# Patient Record
Sex: FEMALE | Race: WHITE | NOT HISPANIC OR LATINO | Employment: UNEMPLOYED | URBAN - METROPOLITAN AREA
[De-identification: names, ages, dates, MRNs, and addresses within clinical notes are randomized per-mention and may not be internally consistent; named-entity substitution may affect disease eponyms.]

---

## 2024-05-07 ENCOUNTER — HOSPITAL ENCOUNTER (EMERGENCY)
Facility: HOSPITAL | Age: 54
Discharge: OTHER INSTITUTION | End: 2024-05-08
Attending: STUDENT IN AN ORGANIZED HEALTH CARE EDUCATION/TRAINING PROGRAM
Payer: COMMERCIAL

## 2024-05-07 DIAGNOSIS — R41.0 DELIRIUM: Primary | ICD-10-CM

## 2024-05-07 LAB
ALBUMIN SERPL-MCNC: 4.5 G/DL (ref 3.5–5)
ALP BLD-CCNC: 132 U/L (ref 35–125)
ALT SERPL-CCNC: 25 U/L (ref 5–40)
ANION GAP SERPL CALC-SCNC: >19 MMOL/L
APAP SERPL-MCNC: 11.2 UG/ML
AST SERPL-CCNC: 32 U/L (ref 5–40)
BASOPHILS # BLD AUTO: 0.02 X10*3/UL (ref 0–0.1)
BASOPHILS NFR BLD AUTO: 0.3 %
BILIRUB SERPL-MCNC: 0.3 MG/DL (ref 0.1–1.2)
BUN SERPL-MCNC: 16 MG/DL (ref 8–25)
CALCIUM SERPL-MCNC: 10.3 MG/DL (ref 8.5–10.4)
CHLORIDE SERPL-SCNC: 97 MMOL/L (ref 97–107)
CO2 SERPL-SCNC: 15 MMOL/L (ref 24–31)
CREAT SERPL-MCNC: 0.7 MG/DL (ref 0.4–1.6)
EGFRCR SERPLBLD CKD-EPI 2021: >90 ML/MIN/1.73M*2
EOSINOPHIL # BLD AUTO: 0 X10*3/UL (ref 0–0.7)
EOSINOPHIL NFR BLD AUTO: 0 %
ERYTHROCYTE [DISTWIDTH] IN BLOOD BY AUTOMATED COUNT: 14 % (ref 11.5–14.5)
ETHANOL SERPL-MCNC: <0.01 G/DL
GLUCOSE SERPL-MCNC: 118 MG/DL (ref 65–99)
HCT VFR BLD AUTO: 41.5 % (ref 36–46)
HGB BLD-MCNC: 14.3 G/DL (ref 12–16)
IMM GRANULOCYTES # BLD AUTO: 0.05 X10*3/UL (ref 0–0.7)
IMM GRANULOCYTES NFR BLD AUTO: 0.6 % (ref 0–0.9)
LIPASE SERPL-CCNC: <16 U/L (ref 16–63)
LYMPHOCYTES # BLD AUTO: 1.04 X10*3/UL (ref 1.2–4.8)
LYMPHOCYTES NFR BLD AUTO: 13.5 %
MCH RBC QN AUTO: 27.6 PG (ref 26–34)
MCHC RBC AUTO-ENTMCNC: 34.5 G/DL (ref 32–36)
MCV RBC AUTO: 80 FL (ref 80–100)
MONOCYTES # BLD AUTO: 0.49 X10*3/UL (ref 0.1–1)
MONOCYTES NFR BLD AUTO: 6.4 %
NEUTROPHILS # BLD AUTO: 6.11 X10*3/UL (ref 1.2–7.7)
NEUTROPHILS NFR BLD AUTO: 79.2 %
NRBC BLD-RTO: 0 /100 WBCS (ref 0–0)
PLATELET # BLD AUTO: 316 X10*3/UL (ref 150–450)
POTASSIUM SERPL-SCNC: 4 MMOL/L (ref 3.4–5.1)
PROT SERPL-MCNC: 8.8 G/DL (ref 5.9–7.9)
RBC # BLD AUTO: 5.19 X10*6/UL (ref 4–5.2)
SALICYLATES SERPL-MCNC: <0 MG/DL
SODIUM SERPL-SCNC: 134 MMOL/L (ref 133–145)
WBC # BLD AUTO: 7.7 X10*3/UL (ref 4.4–11.3)

## 2024-05-07 PROCEDURE — 2500000002 HC RX 250 W HCPCS SELF ADMINISTERED DRUGS (ALT 637 FOR MEDICARE OP, ALT 636 FOR OP/ED)

## 2024-05-07 PROCEDURE — S4991 NICOTINE PATCH NONLEGEND: HCPCS

## 2024-05-07 PROCEDURE — 36415 COLL VENOUS BLD VENIPUNCTURE: CPT

## 2024-05-07 PROCEDURE — 99285 EMERGENCY DEPT VISIT HI MDM: CPT | Mod: 25

## 2024-05-07 PROCEDURE — 83690 ASSAY OF LIPASE: CPT

## 2024-05-07 PROCEDURE — 2500000004 HC RX 250 GENERAL PHARMACY W/ HCPCS (ALT 636 FOR OP/ED): Mod: JZ | Performed by: STUDENT IN AN ORGANIZED HEALTH CARE EDUCATION/TRAINING PROGRAM

## 2024-05-07 PROCEDURE — 2500000004 HC RX 250 GENERAL PHARMACY W/ HCPCS (ALT 636 FOR OP/ED)

## 2024-05-07 PROCEDURE — 80053 COMPREHEN METABOLIC PANEL: CPT

## 2024-05-07 PROCEDURE — 96372 THER/PROPH/DIAG INJ SC/IM: CPT | Performed by: STUDENT IN AN ORGANIZED HEALTH CARE EDUCATION/TRAINING PROGRAM

## 2024-05-07 PROCEDURE — 81003 URINALYSIS AUTO W/O SCOPE: CPT | Mod: 59

## 2024-05-07 PROCEDURE — 80143 DRUG ASSAY ACETAMINOPHEN: CPT

## 2024-05-07 PROCEDURE — 2500000001 HC RX 250 WO HCPCS SELF ADMINISTERED DRUGS (ALT 637 FOR MEDICARE OP): Performed by: EMERGENCY MEDICINE

## 2024-05-07 PROCEDURE — 85025 COMPLETE CBC W/AUTO DIFF WBC: CPT

## 2024-05-07 PROCEDURE — 96372 THER/PROPH/DIAG INJ SC/IM: CPT

## 2024-05-07 PROCEDURE — 80307 DRUG TEST PRSMV CHEM ANLYZR: CPT

## 2024-05-07 RX ORDER — ACETAMINOPHEN 325 MG/1
650 TABLET ORAL EVERY 4 HOURS PRN
Status: DISCONTINUED | OUTPATIENT
Start: 2024-05-07 | End: 2024-05-08 | Stop reason: HOSPADM

## 2024-05-07 RX ORDER — DIPHENHYDRAMINE HYDROCHLORIDE 50 MG/ML
50 INJECTION INTRAMUSCULAR; INTRAVENOUS ONCE
Status: COMPLETED | OUTPATIENT
Start: 2024-05-07 | End: 2024-05-07

## 2024-05-07 RX ORDER — ZIPRASIDONE HYDROCHLORIDE 20 MG/1
20 CAPSULE ORAL
Status: DISCONTINUED | OUTPATIENT
Start: 2024-05-07 | End: 2024-05-08 | Stop reason: HOSPADM

## 2024-05-07 RX ORDER — ZIPRASIDONE MESYLATE 20 MG/ML
20 INJECTION, POWDER, LYOPHILIZED, FOR SOLUTION INTRAMUSCULAR ONCE
Status: COMPLETED | OUTPATIENT
Start: 2024-05-07 | End: 2024-05-07

## 2024-05-07 RX ORDER — IBUPROFEN 200 MG
1 TABLET ORAL DAILY
Status: DISCONTINUED | OUTPATIENT
Start: 2024-05-08 | End: 2024-05-07

## 2024-05-07 RX ORDER — IBUPROFEN 200 MG
1 TABLET ORAL NIGHTLY
Status: DISCONTINUED | OUTPATIENT
Start: 2024-05-07 | End: 2024-05-08 | Stop reason: HOSPADM

## 2024-05-07 RX ORDER — OLANZAPINE 10 MG/2ML
10 INJECTION, POWDER, FOR SOLUTION INTRAMUSCULAR ONCE
Status: COMPLETED | OUTPATIENT
Start: 2024-05-07 | End: 2024-05-07

## 2024-05-07 RX ORDER — LORAZEPAM 1 MG/1
2 TABLET ORAL ONCE
Status: COMPLETED | OUTPATIENT
Start: 2024-05-07 | End: 2024-05-07

## 2024-05-07 RX ADMIN — NICOTINE 1 PATCH: 21 PATCH, EXTENDED RELEASE TRANSDERMAL at 22:57

## 2024-05-07 RX ADMIN — OLANZAPINE 10 MG: 10 INJECTION, POWDER, FOR SOLUTION INTRAMUSCULAR at 17:50

## 2024-05-07 RX ADMIN — ZIPRASIDONE MESYLATE 20 MG: 20 INJECTION, POWDER, LYOPHILIZED, FOR SOLUTION INTRAMUSCULAR at 22:00

## 2024-05-07 RX ADMIN — ACETAMINOPHEN 650 MG: 325 TABLET ORAL at 22:57

## 2024-05-07 RX ADMIN — LORAZEPAM 2 MG: 1 TABLET ORAL at 23:41

## 2024-05-07 RX ADMIN — ZIPRASIDONE HYDROCHLORIDE 20 MG: 20 CAPSULE ORAL at 22:57

## 2024-05-07 RX ADMIN — DIPHENHYDRAMINE HYDROCHLORIDE 50 MG: 50 INJECTION INTRAMUSCULAR; INTRAVENOUS at 22:00

## 2024-05-07 SDOH — HEALTH STABILITY: MENTAL HEALTH: DEPRESSION SYMPTOMS: NO PROBLEMS REPORTED OR OBSERVED.

## 2024-05-07 SDOH — HEALTH STABILITY: MENTAL HEALTH: WISH TO BE DEAD (PAST 1 MONTH): NO

## 2024-05-07 SDOH — HEALTH STABILITY: MENTAL HEALTH: SUICIDAL BEHAVIOR (LIFETIME): NO

## 2024-05-07 SDOH — HEALTH STABILITY: MENTAL HEALTH: NON-SPECIFIC ACTIVE SUICIDAL THOUGHTS (PAST 1 MONTH): NO

## 2024-05-07 SDOH — ECONOMIC STABILITY: HOUSING INSECURITY: FEELS SAFE LIVING IN HOME: YES

## 2024-05-07 SDOH — HEALTH STABILITY: MENTAL HEALTH: IN THE PAST FEW WEEKS, HAVE YOU FELT THAT YOU OR YOUR FAMILY WOULD BE BETTER OFF IF YOU WERE DEAD?: NO

## 2024-05-07 SDOH — HEALTH STABILITY: MENTAL HEALTH: HAVE YOU EVER TRIED TO KILL YOURSELF?: NO

## 2024-05-07 SDOH — HEALTH STABILITY: MENTAL HEALTH: IN THE PAST FEW WEEKS, HAVE YOU WISHED YOU WERE DEAD?: NO

## 2024-05-07 SDOH — HEALTH STABILITY: MENTAL HEALTH: SUICIDAL BEHAVIOR (3 MONTHS): NO

## 2024-05-07 SDOH — HEALTH STABILITY: MENTAL HEALTH: ARE YOU HAVING THOUGHTS OF KILLING YOURSELF RIGHT NOW?: NO

## 2024-05-07 SDOH — HEALTH STABILITY: MENTAL HEALTH: ANXIETY SYMPTOMS: GENERALIZED

## 2024-05-07 SDOH — HEALTH STABILITY: MENTAL HEALTH: IN THE PAST WEEK, HAVE YOU BEEN HAVING THOUGHTS ABOUT KILLING YOURSELF?: NO

## 2024-05-07 ASSESSMENT — PAIN SCALES - GENERAL: PAINLEVEL_OUTOF10: 0 - NO PAIN

## 2024-05-07 ASSESSMENT — COLUMBIA-SUICIDE SEVERITY RATING SCALE - C-SSRS
2. HAVE YOU ACTUALLY HAD ANY THOUGHTS OF KILLING YOURSELF?: NO
6. HAVE YOU EVER DONE ANYTHING, STARTED TO DO ANYTHING, OR PREPARED TO DO ANYTHING TO END YOUR LIFE?: NO
1. SINCE LAST CONTACT, HAVE YOU WISHED YOU WERE DEAD OR WISHED YOU COULD GO TO SLEEP AND NOT WAKE UP?: NO

## 2024-05-07 ASSESSMENT — PAIN - FUNCTIONAL ASSESSMENT: PAIN_FUNCTIONAL_ASSESSMENT: 0-10

## 2024-05-07 ASSESSMENT — LIFESTYLE VARIABLES
SUBSTANCE_ABUSE_PAST_12_MONTHS: YES
PRESCIPTION_ABUSE_PAST_12_MONTHS: NO

## 2024-05-07 NOTE — PROGRESS NOTES
"EPAT - Social Work Psychiatric Assessment    Arrival Details  Mode of Arrival: Ambulance  Admission Source: Home  Admission Type: Involuntary (Pink slip by ED )  EPAT Assessment Start Date: 05/07/24  EPAT Assessment Start Time: 1637  Name of : MARTHA Ponce    History of Present Illness  Admission Reason: Psychiatric Evaluation  HPI: Pt is a 52 y/o F brought in by EMS for altered mental status and possible drug intoxication. Pt's mother called 911 concerned pt was in a manic state due to her presentation. Upon arrival pt appears very paranoid with staff reporting she does not want them in her room because we will \"upload her information into the apps\". Pt reported to staff that she \"did not watch the entire episode and mom got mad, and this is why she called 911\". Pt admits to taking 3 marijuana tablets because she was \"freaking out\" at her mom for trying to upload her information. Social work consulted due to pt presenting very disorganized and paranoid with possible underlining mental health concerns. Social work reviewed Pts chart, medical record, and provider notes which indicate history of Schizoaffective DO, MDD, CANDY, PTSD, and substance use. Records indicate outpatient providers and medication currently. Pt denies SI/HI/AVH but continues to appear very paranoid and distrusting of staff. The triage risk assessment was reviewed and Pt was inidcated to be low risk during triage assessment.    SW Readmission Information   Readmission within 30 Days: No    Psychiatric Symptoms  Anxiety Symptoms: Generalized  Depression Symptoms: No problems reported or observed.  Grisel Symptoms: Labile, Poor judgment, Pressured speech    Psychosis Symptoms  Hallucination Type: No problems reported or observed.  Delusion Type: Paranoid    Additional Symptoms - Adult  Generalized Anxiety Disorder: No problems reported or observed.  Obsessive Compulsive Disorder: No problems reported or observed.  Panic " "Attack: No problems reported or observed.  Post Traumatic Stress Disorder: No problems reported or observed.  Delirium: No problems reported or observed.    Past Psychiatric History/Meds/Treatments  Past Psychiatric History: History of schizoaffective, depression, anxiety and PTSD. Pt has extensive history of THC reporting she has been using since she was a teenager. Pt also has history of alcohol abuse but nothing documented since 2015. Pt admits she is on \"loads of medications\" but cannot say what they are.  Past Psychiatric Meds/Treatments: Pt admits to being hospitalized when she was a teenager for mental health. She has outpatient providers through [facility]  Past Violence/Victimization History: None reported    Current Mental Health Contacts  Provider Name/Phone Number: [facility], pt could not verify name of specific provider. Per records pt met with Adia Barron for last medication management appointment.  Provider Last Appointment Date: 2/5/2024    Support System: Immediate family    Living Arrangement: Lives with someone (mother)    Home Safety  Feels Safe Living in Home: Yes    Income Information  Employment Status for: Patient  Employment Status: Unemployed    [place] Service/Education History  Current or Previous  Service: None    Social/Cultural History  Cultural Requests During Hospitalization: None  Spiritual Requests During Hospitalization: None    Legal  Legal Considerations: Patient/ Family Ability to Make Healthcare Decisions  Assistance with Managing/Advocating Healthcare Needs: Other (Comment)  Criminal Activity/ Legal Involvement Pertinent to Current Situation/ Hospitalization: None  Legal Concerns: None    Drug Screening  Have you used any substances (canabis, cocaine, heroin, hallucinogens, inhalants, etc.) in the past 12 months?: Yes  Have you used any prescription drugs other than prescribed in the past 12 months?: No  Is a toxicology screen needed?: Yes    Stage of " Change  Stage of Change: Precontemplation  Type of Treatment Offered: Other (Comment) (unknown, pt poor historian and will not disclose information due to suspicion of staff members.)  Frequency of Substance Use: Daily or almost daily for marijuana use.    Psychosocial  Psychosocial (WDL): Within Defined Limits    Orientation  Orientation Level: Oriented X4    General Appearance  Motor Activity: Restlessness  Speech Pattern: Pressured  General Attitude: Cooperative, Guarded, Suspicious  Appearance/Hygiene: Disheveled    Thought Process  Coherency: Blocking, Disorganized  Content: Blaming others  Delusions: Paranoid  Perception: Not altered  Hallucination: None  Judgment/Insight: Impaired  Confusion: None  Cognition: Poor judgement    Sleep Pattern  Sleep Pattern: Unable to assess    Risk Factors  Self Harm/Suicidal Ideation Plan: Pt denies  Previous Self Harm/Suicidal Plans: Pt denies any history of SI or attempts.  Risk Factors: Major mental illness, Lower socioeconomic status, Substance abuse, Unemployment    Violence Risk Assessment  Assessment of Violence: None noted  Thoughts of Harm to Others: No    Ability to Assess Risk Screen  Risk Screen - Ability to Assess: Able to be screened  Ask Suicide-Screening Questions  1. In the past few weeks, have you wished you were dead?: No  2. In the past few weeks, have you felt that you or your family would be better off if you were dead?: No  3. In the past week, have you been having thoughts about killing yourself?: No  4. Have you ever tried to kill yourself?: No  5. Are you having thoughts of killing yourself right now?: No  Calculated Risk Score: No intervention is necessary  [place] Suicide Severity Rating Scale (Screener/Recent Self-Report)  1. Wish to be Dead (Past 1 Month): No  2. Non-Specific Active Suicidal Thoughts (Past 1 Month): No  6. Suicidal Behavior (Lifetime): No  6. Suicidal Behavior (3 Months): No  Calculated C-SSRS Risk Score (Lifetime/Recent): No  "Risk Indicated  Step 1: Risk Factors  Current & Past Psychiatric Dx: Mood disorder, Psychotic disorder, Alcohol/substance abuse disorders, PTSD  Presenting Symptoms: Impulsivity, Anxiety and/or panic  Precipitants/Stressors: Triggering events leading to humiliation, shame, and/or despair (e.g. loss of relationship, financial or health status) (real or anticipated), Substance intoxication or withdrawal  Change in Treatment: Other (Comment)  Access to Lethal Methods : No  Step 2: Protective Factors   Protective Factors Internal: Identifies reasons for living  Protective Factors External: Supportive social network or family or friends, Positive therapeutic relationships  Step 3: Suicidal Ideation Intensity  Are There Things - Anyone or Anything - That Stopped You From Wanting to Die or Acting on: Does not apply  What Sort of Reasons Did You Have For Thinking About Wanting to Die or Killing Yourself: Does not apply  Step 5: Documentation  Risk Level: Low suicide risk    Psychiatric Impression and Plan of Care  Assessment and Plan: Upon assessment Pt continues to present with extreme paranoia and would not provide information or history believing social work is uploading her information to \"apps\" on the TV in the room. Social work explained TV is off and unplugged and pt calmed down for a period of time and began answering some questions about how her mother \"called Temu\" because pt \"uploaded too many photo's on her phone\". Pt states \"I was freaking out\" believing that all her conversations and photos on the phone were being accessed by mom and so she took the marijuana to calm down. Pt states this is why she is here. Then pt began looking at the TV and out the door at staff in the ED stating she did not believe social work was telling her the truth \"about the apps\" and she did not want to provider her mental health history because she states \"your minion friend out there will upload it for you\" pointing outside the room " "to noone. Pt was referring to another pt that had walked to the bathroom reporting \"your friend is staring at me\" and \"I know she is listening to my information\". Pt admits she is on \"loads of medication\" but could not say what she is currently prescribed. Pt continues to deny SI/HI/AVH but does not appear to have any insight into situation. Pt presents with low risk at time of social work assessment however still presents with extreme paranoia and thought blocking.  Specific Resources Provided to Patient: Peer support not needed at this time.  CM Notified: Yes  Plan Comments: Diagnostic Impression: Schizoaffective DO    Outcome/Disposition  Assessment, Recommendations and Risk Level Reviewed with: Reviewed case with Dr. Ortega who is in agreement pt would benefit inpatient placement for safety and stabilization.  EPAT Assessment Completed Date: 05/07/24  EPAT Assessment Completed Time: 1703  Patient Disposition: Out of network facility (Specify)  Out of Network Reason: Other (Comment)      "

## 2024-05-07 NOTE — ED PROVIDER NOTES
HPI   Chief Complaint   Patient presents with    Altered Mental Status       HPI  Is a 53-year-old female with history of schizoaffective disorder, polysubstance abuse who presents to ED for altered mental status.  EMS was called by patient's mother, patient lives with mother in apartment.  I contacted the mother of the patient and spoke at length with her about her daughter.  Per the mother her daughter has a history of heavy alcohol abuse, has recently been taking a lot of unknown pills, smokes cannabis daily.  Mother is an elderly 81-year-old woman and feels she cannot take care of her daughter so she called EMS.  Patient is not answering questions appropriately which limits history.                  Erica Coma Scale Score: 14                     Patient History   No past medical history on file.  No past surgical history on file.  No family history on file.  Social History     Tobacco Use    Smoking status: Not on file    Smokeless tobacco: Not on file   Substance Use Topics    Alcohol use: Not on file    Drug use: Not on file       Physical Exam   ED Triage Vitals [05/07/24 1512]   Temp Heart Rate Respirations BP   -- 83 17 (!) 189/120      Pulse Ox Temp src Heart Rate Source Patient Position   100 % -- -- --      BP Location FiO2 (%)     -- --       Physical Exam  Vitals reviewed.   Constitutional:       Appearance: She is well-developed.   HENT:      Head: Atraumatic.   Eyes:      Pupils: Pupils are equal, round, and reactive to light.   Cardiovascular:      Rate and Rhythm: Normal rate.   Pulmonary:      Effort: Pulmonary effort is normal.   Abdominal:      General: Abdomen is flat.   Musculoskeletal:         General: Normal range of motion.      Cervical back: Neck supple.   Skin:     General: Skin is dry.   Neurological:      General: No focal deficit present.   Psychiatric:         Mood and Affect: Mood normal.         Behavior: Behavior normal.         ED Course & MDM   Diagnoses as of 05/08/24 0103    Delirium       Medical Decision Making  Parts of this chart have been completed using voice recognition software. Please excuse any errors of transcription.  My thought process and reason for plan has been formulated from the time that I saw the patient until the time of disposition and is not specific to one specific moment during their visit and furthermore my MDM encompasses this entire chart and not only this text box.    HPI:   Detailed above.    Exam:   A medically appropriate exam performed, outlined above, given the known history and presentation.    History obtained from:   Patient's mother, EMR    EKG/Cardiac monitor:       Social Determinants of Health considered during this visit:   Housing, Family or social support    Medications given during visit:  Medications   ziprasidone (Geodon) capsule 20 mg (20 mg oral Given 5/7/24 2257)   acetaminophen (Tylenol) tablet 650 mg (650 mg oral Given 5/7/24 2257)   nicotine (Nicoderm CQ) 21 mg/24 hr patch 1 patch (1 patch transdermal Medication Applied 5/7/24 2257)   OLANZapine (ZyPREXA) injection 10 mg (10 mg intramuscular Given 5/7/24 1750)   ziprasidone (Geodon) injection 20 mg (20 mg intramuscular Given 5/7/24 2200)   diphenhydrAMINE (BENADryl) injection 50 mg (50 mg intramuscular Given 5/7/24 2200)   LORazepam (Ativan) tablet 2 mg (2 mg oral Given 5/7/24 2341)        Diagnostic/tests:  Labs Reviewed   CBC WITH AUTO DIFFERENTIAL - Abnormal       Result Value    WBC 7.7      nRBC 0.0      RBC 5.19      Hemoglobin 14.3      Hematocrit 41.5      MCV 80      MCH 27.6      MCHC 34.5      RDW 14.0      Platelets 316      Neutrophils % 79.2      Immature Granulocytes %, Automated 0.6      Lymphocytes % 13.5      Monocytes % 6.4      Eosinophils % 0.0      Basophils % 0.3      Neutrophils Absolute 6.11      Immature Granulocytes Absolute, Automated 0.05      Lymphocytes Absolute 1.04 (*)     Monocytes Absolute 0.49      Eosinophils Absolute 0.00      Basophils Absolute  0.02     COMPREHENSIVE METABOLIC PANEL - Abnormal    Glucose 118 (*)     Sodium 134      Potassium 4.0      Chloride 97      Bicarbonate 15 (*)     Urea Nitrogen 16      Creatinine 0.70      eGFR >90      Calcium 10.3      Albumin 4.5      Alkaline Phosphatase 132 (*)     Total Protein 8.8 (*)     AST 32      Bilirubin, Total 0.3      ALT 25      Anion Gap >19 (*)    LIPASE - Abnormal    Lipase <16 (*)    ACUTE TOXICOLOGY PANEL, BLOOD - Normal    Acetaminophen 11.2      Salicylate  <0      Alcohol <0.010     URINALYSIS WITH REFLEX CULTURE AND MICROSCOPIC    Narrative:     The following orders were created for panel order Urinalysis with Reflex Culture and Microscopic.  Procedure                               Abnormality         Status                     ---------                               -----------         ------                     Urinalysis with Reflex C...[[id number]]                                                 Extra Urine Gray Tube[[id number]]                                                         Please view results for these tests on the individual orders.   DRUG SCREEN,URINE   URINALYSIS WITH REFLEX CULTURE AND MICROSCOPIC   EXTRA URINE GRAY TUBE      No orders to display        Differential Diagnosis:   As I have deemed necessary from their history, physical and studies, I have considered the following diagnoses:     SUICIDAL IDEATION  HOMICIDAL IDEATION  SCHIZOPHRENIA  BIPOLAR DISORDER  MAJOR DEPRESSIVE DISORDER  GENERALIZED ANXIETY DISORDER  SUBSTANCE ABUSE  ALCOHOL ABUSE  ACUTE STRESS REACTION      Consultations:      Procedures:      Critical Care:  Upon my evaluation, this patient had a high probability of imminent or life-threatening deterioration due to altered mental status/acute psychosis, which required my direct attention, intervention, and personal management.    I have personally provided 30 minutes of non-concurrent critical care time exclusive of time spent on  separately billable procedures. Time includes review of laboratory data, radiology results, discussion with consultants, and monitoring for potential decompensation. Interventions were performed as documented above.    Interventions include: Prevention of self-harm    Referrals:      Discharge Prescriptions:      MDM Summary:  Patient is unable to make decisions for herself at this time, she appears acutely psychotic.  Lab workup is unremarkable.  Urinalysis and urine drug screen are still pending.  I have signed out the patient´s emergency department care to the attending physician Dr. Seth. We discussed the pertinent history, physical exam, completed/pending test results (if applicable) and current treatment plan. Please refer to his/her chart for the patients remaining Emergency Department course and final disposition.      Procedure  Procedures     Ralph Wayne PA-C  05/08/24 0104

## 2024-05-08 VITALS
DIASTOLIC BLOOD PRESSURE: 88 MMHG | HEIGHT: 64 IN | TEMPERATURE: 98.2 F | OXYGEN SATURATION: 100 % | WEIGHT: 138.23 LBS | BODY MASS INDEX: 23.6 KG/M2 | RESPIRATION RATE: 16 BRPM | HEART RATE: 88 BPM | SYSTOLIC BLOOD PRESSURE: 138 MMHG

## 2024-05-08 LAB
AMPHETAMINES UR QL SCN>1000 NG/ML: NEGATIVE
APPEARANCE UR: CLEAR
BARBITURATES UR QL SCN>300 NG/ML: NEGATIVE
BENZODIAZ UR QL SCN>300 NG/ML: NEGATIVE
BILIRUB UR STRIP.AUTO-MCNC: NEGATIVE MG/DL
BZE UR QL SCN>300 NG/ML: NEGATIVE
CANNABINOIDS UR QL SCN>50 NG/ML: POSITIVE
COLOR UR: ABNORMAL
FENTANYL+NORFENTANYL UR QL SCN: NEGATIVE
GLUCOSE UR STRIP.AUTO-MCNC: NORMAL MG/DL
HOLD SPECIMEN: NORMAL
KETONES UR STRIP.AUTO-MCNC: ABNORMAL MG/DL
LEUKOCYTE ESTERASE UR QL STRIP.AUTO: NEGATIVE
METHADONE UR QL SCN>300 NG/ML: NEGATIVE
NITRITE UR QL STRIP.AUTO: NEGATIVE
OPIATES UR QL SCN>300 NG/ML: NEGATIVE
OXYCODONE UR QL: NEGATIVE
PCP UR QL SCN>25 NG/ML: NEGATIVE
PH UR STRIP.AUTO: 5.5 [PH]
PROT UR STRIP.AUTO-MCNC: NEGATIVE MG/DL
RBC # UR STRIP.AUTO: NEGATIVE /UL
SP GR UR STRIP.AUTO: 1.01
UROBILINOGEN UR STRIP.AUTO-MCNC: NORMAL MG/DL

## 2024-05-08 NOTE — ED NOTES
Patient accepted at [facility] , report given to nurse Kruse. Security and PCA to transport     Hinafatmata GARCIA Guy, CLAIRE  05/08/24 0235

## 2024-05-08 NOTE — ED NOTES
Patient ambulated to restroom and provided urine sample which was sent to lab, social work brian Tovar, CLAIRE  05/08/24 0146

## 2024-05-22 ENCOUNTER — DOCUMENTATION (OUTPATIENT)
Dept: SOCIAL WORK | Age: 54
End: 2024-05-22
Payer: COMMERCIAL

## 2024-05-22 ENCOUNTER — HOSPITAL ENCOUNTER (EMERGENCY)
Facility: HOSPITAL | Age: 54
Discharge: OTHER INSTITUTION | End: 2024-05-23
Attending: EMERGENCY MEDICINE
Payer: COMMERCIAL

## 2024-05-22 ENCOUNTER — APPOINTMENT (OUTPATIENT)
Dept: CARDIOLOGY | Facility: HOSPITAL | Age: 54
End: 2024-05-22
Payer: COMMERCIAL

## 2024-05-22 DIAGNOSIS — F23 ACUTE PSYCHOSIS (MULTI): Primary | ICD-10-CM

## 2024-05-22 DIAGNOSIS — Z86.59 HISTORY OF SCHIZOAFFECTIVE DISORDER: ICD-10-CM

## 2024-05-22 LAB
ALBUMIN SERPL-MCNC: 4.4 G/DL (ref 3.5–5)
ALP BLD-CCNC: 100 U/L (ref 35–125)
ALT SERPL-CCNC: 15 U/L (ref 5–40)
ANION GAP SERPL CALC-SCNC: 18 MMOL/L
APAP SERPL-MCNC: <5 UG/ML
AST SERPL-CCNC: 26 U/L (ref 5–40)
BASOPHILS # BLD AUTO: 0.04 X10*3/UL (ref 0–0.1)
BASOPHILS NFR BLD AUTO: 0.3 %
BILIRUB SERPL-MCNC: 0.3 MG/DL (ref 0.1–1.2)
BUN SERPL-MCNC: 14 MG/DL (ref 8–25)
CALCIUM SERPL-MCNC: 9.6 MG/DL (ref 8.5–10.4)
CHLORIDE SERPL-SCNC: 92 MMOL/L (ref 97–107)
CO2 SERPL-SCNC: 18 MMOL/L (ref 24–31)
CREAT SERPL-MCNC: 0.9 MG/DL (ref 0.4–1.6)
EGFRCR SERPLBLD CKD-EPI 2021: 77 ML/MIN/1.73M*2
EOSINOPHIL # BLD AUTO: 0.02 X10*3/UL (ref 0–0.7)
EOSINOPHIL NFR BLD AUTO: 0.1 %
ERYTHROCYTE [DISTWIDTH] IN BLOOD BY AUTOMATED COUNT: 15 % (ref 11.5–14.5)
ETHANOL SERPL-MCNC: <0.01 G/DL
GLUCOSE SERPL-MCNC: 192 MG/DL (ref 65–99)
HCT VFR BLD AUTO: 33.7 % (ref 36–46)
HGB BLD-MCNC: 11.3 G/DL (ref 12–16)
IMM GRANULOCYTES # BLD AUTO: 0.07 X10*3/UL (ref 0–0.7)
IMM GRANULOCYTES NFR BLD AUTO: 0.5 % (ref 0–0.9)
LYMPHOCYTES # BLD AUTO: 1.39 X10*3/UL (ref 1.2–4.8)
LYMPHOCYTES NFR BLD AUTO: 9.1 %
MCH RBC QN AUTO: 28 PG (ref 26–34)
MCHC RBC AUTO-ENTMCNC: 33.5 G/DL (ref 32–36)
MCV RBC AUTO: 83 FL (ref 80–100)
MONOCYTES # BLD AUTO: 1.53 X10*3/UL (ref 0.1–1)
MONOCYTES NFR BLD AUTO: 10 %
NEUTROPHILS # BLD AUTO: 12.21 X10*3/UL (ref 1.2–7.7)
NEUTROPHILS NFR BLD AUTO: 80 %
NRBC BLD-RTO: 0 /100 WBCS (ref 0–0)
PLATELET # BLD AUTO: 257 X10*3/UL (ref 150–450)
POTASSIUM SERPL-SCNC: 4.2 MMOL/L (ref 3.4–5.1)
PROT SERPL-MCNC: 7.7 G/DL (ref 5.9–7.9)
RBC # BLD AUTO: 4.04 X10*6/UL (ref 4–5.2)
SALICYLATES SERPL-MCNC: <0 MG/DL
SARS-COV-2 RNA RESP QL NAA+PROBE: NOT DETECTED
SODIUM SERPL-SCNC: 128 MMOL/L (ref 133–145)
WBC # BLD AUTO: 15.3 X10*3/UL (ref 4.4–11.3)

## 2024-05-22 PROCEDURE — 80320 DRUG SCREEN QUANTALCOHOLS: CPT | Performed by: EMERGENCY MEDICINE

## 2024-05-22 PROCEDURE — 80053 COMPREHEN METABOLIC PANEL: CPT | Performed by: EMERGENCY MEDICINE

## 2024-05-22 PROCEDURE — 2500000004 HC RX 250 GENERAL PHARMACY W/ HCPCS (ALT 636 FOR OP/ED): Performed by: EMERGENCY MEDICINE

## 2024-05-22 PROCEDURE — 90839 PSYTX CRISIS INITIAL 60 MIN: CPT

## 2024-05-22 PROCEDURE — 99285 EMERGENCY DEPT VISIT HI MDM: CPT | Mod: 25,CS

## 2024-05-22 PROCEDURE — 93005 ELECTROCARDIOGRAM TRACING: CPT

## 2024-05-22 PROCEDURE — 85025 COMPLETE CBC W/AUTO DIFF WBC: CPT | Performed by: EMERGENCY MEDICINE

## 2024-05-22 PROCEDURE — 2500000006 HC RX 250 W HCPCS SELF ADMINISTERED DRUGS (ALT 637 FOR ALL PAYERS): Performed by: EMERGENCY MEDICINE

## 2024-05-22 PROCEDURE — 87635 SARS-COV-2 COVID-19 AMP PRB: CPT | Performed by: EMERGENCY MEDICINE

## 2024-05-22 PROCEDURE — 96372 THER/PROPH/DIAG INJ SC/IM: CPT | Performed by: EMERGENCY MEDICINE

## 2024-05-22 PROCEDURE — 36415 COLL VENOUS BLD VENIPUNCTURE: CPT | Performed by: EMERGENCY MEDICINE

## 2024-05-22 RX ORDER — DROPERIDOL 2.5 MG/ML
5 INJECTION, SOLUTION INTRAMUSCULAR; INTRAVENOUS ONCE
Status: COMPLETED | OUTPATIENT
Start: 2024-05-22 | End: 2024-05-22

## 2024-05-22 RX ORDER — DIAZEPAM 5 MG/1
10 TABLET ORAL ONCE
Status: COMPLETED | OUTPATIENT
Start: 2024-05-22 | End: 2024-05-22

## 2024-05-22 RX ORDER — DIPHENHYDRAMINE HYDROCHLORIDE 50 MG/ML
50 INJECTION INTRAMUSCULAR; INTRAVENOUS ONCE
Status: COMPLETED | OUTPATIENT
Start: 2024-05-22 | End: 2024-05-22

## 2024-05-22 RX ORDER — LORAZEPAM 2 MG/ML
1 INJECTION INTRAMUSCULAR ONCE
Status: COMPLETED | OUTPATIENT
Start: 2024-05-22 | End: 2024-05-22

## 2024-05-22 RX ADMIN — DROPERIDOL 5 MG: 2.5 INJECTION, SOLUTION INTRAMUSCULAR; INTRAVENOUS at 19:22

## 2024-05-22 RX ADMIN — DIAZEPAM 10 MG: 5 TABLET ORAL at 23:05

## 2024-05-22 RX ADMIN — DIPHENHYDRAMINE HYDROCHLORIDE 50 MG: 50 INJECTION, SOLUTION INTRAMUSCULAR; INTRAVENOUS at 19:22

## 2024-05-22 RX ADMIN — LORAZEPAM 1 MG: 2 INJECTION INTRAMUSCULAR; INTRAVENOUS at 19:22

## 2024-05-22 SDOH — HEALTH STABILITY: MENTAL HEALTH: BEHAVIORS/MOOD: AGITATED;ANXIOUS;COMBATIVE;DELUSIONS;IMPULSIVE;HYPER-VERBAL;PARANOID

## 2024-05-22 SDOH — HEALTH STABILITY: MENTAL HEALTH: SUICIDAL BEHAVIOR (LIFETIME): NO

## 2024-05-22 SDOH — HEALTH STABILITY: MENTAL HEALTH: HAVE YOU WISHED YOU WERE DEAD OR WISHED YOU COULD GO TO SLEEP AND NOT WAKE UP?: NO

## 2024-05-22 SDOH — HEALTH STABILITY: MENTAL HEALTH: IN THE PAST WEEK, HAVE YOU BEEN HAVING THOUGHTS ABOUT KILLING YOURSELF?: NO

## 2024-05-22 SDOH — HEALTH STABILITY: MENTAL HEALTH: SLEEP PATTERN: RESTLESSNESS;INSOMNIA

## 2024-05-22 SDOH — HEALTH STABILITY: MENTAL HEALTH: HAVE YOU ACTUALLY HAD ANY THOUGHTS OF KILLING YOURSELF?: NO

## 2024-05-22 SDOH — HEALTH STABILITY: MENTAL HEALTH: SUICIDE ASSESSMENT: ADULT (C-SSRS)

## 2024-05-22 SDOH — HEALTH STABILITY: MENTAL HEALTH: ARE YOU HAVING THOUGHTS OF KILLING YOURSELF RIGHT NOW?: NO

## 2024-05-22 SDOH — HEALTH STABILITY: MENTAL HEALTH: IN THE PAST FEW WEEKS, HAVE YOU FELT THAT YOU OR YOUR FAMILY WOULD BE BETTER OFF IF YOU WERE DEAD?: NO

## 2024-05-22 SDOH — HEALTH STABILITY: MENTAL HEALTH: ANXIETY SYMPTOMS: PANIC ATTACK;GENERALIZED;OBSESSIONS

## 2024-05-22 SDOH — HEALTH STABILITY: MENTAL HEALTH: WISH TO BE DEAD (PAST 1 MONTH): NO

## 2024-05-22 SDOH — HEALTH STABILITY: MENTAL HEALTH: IN THE PAST FEW WEEKS, HAVE YOU WISHED YOU WERE DEAD?: NO

## 2024-05-22 SDOH — HEALTH STABILITY: MENTAL HEALTH: HAVE YOU EVER TRIED TO KILL YOURSELF?: NO

## 2024-05-22 SDOH — HEALTH STABILITY: MENTAL HEALTH: DELUSIONS: PARANOID

## 2024-05-22 SDOH — HEALTH STABILITY: MENTAL HEALTH: NON-SPECIFIC ACTIVE SUICIDAL THOUGHTS (PAST 1 MONTH): NO

## 2024-05-22 SDOH — HEALTH STABILITY: MENTAL HEALTH: DEPRESSION SYMPTOMS: NO PROBLEMS REPORTED OR OBSERVED.

## 2024-05-22 SDOH — ECONOMIC STABILITY: HOUSING INSECURITY: FEELS SAFE LIVING IN HOME: NO

## 2024-05-22 SDOH — HEALTH STABILITY: MENTAL HEALTH: HAVE YOU EVER DONE ANYTHING, STARTED TO DO ANYTHING, OR PREPARED TO DO ANYTHING TO END YOUR LIFE?: NO

## 2024-05-22 ASSESSMENT — LIFESTYLE VARIABLES
PRESCIPTION_ABUSE_PAST_12_MONTHS: NO
SUBSTANCE_ABUSE_PAST_12_MONTHS: NO

## 2024-05-22 NOTE — ED NOTES
Blood obtained and sent to the lab. Patient is yelling out in the room.      Lorenza Allen RN  05/22/24 1937

## 2024-05-22 NOTE — ED NOTES
Patient belongings labeled and placed in locker 7      1 shirt  1 pair of pants  1 pair of shoes  No phone      Ariella Roa, EMT  05/22/24 7267

## 2024-05-22 NOTE — ED PROVIDER NOTES
"HPI   Chief Complaint   Patient presents with    Psychiatric Evaluation     Pt arrives via [employer]. Pt states that [facility] is trying to take her. Pt keeps repeating the same things over and over again         History provided by:  Patient  History limited by:  Psychiatric disorder   used: No      53-year-old female brought to the emergency department by the police    She says that her mother has been poisoning her with Seroquel and Protonix.    She denies any headache, chest pain, abdominal pain    She is otherwise not able to give much history as she keeps perseverating     From phone call earlier today with mental health care worker…  5/22/2024 3:00 PM   Reason For Visit Assessment following emergency department or hospital discharge.   Interval History and Patient Concerns Pt's mother Guera called w concerns over Prabha. She reports that pt is acting : irrational, rambling, and police were clld to her apt to assess pt\" \" The PD recommended, [facility] was called for, but they never came to see pt\" Spoke w pt, and she gave me verbal permission to s/w mom Guera. JESS pt, she appears irrational, not answering my quest's appropriately. Pt admits to smoking THC. Unsure is she is taking her meds- seroquel 800 mgs HS, prescribed upon dc at [facility] 5/20/24 for juan j. Pt last saw Adia Barron, 10/23. Made an appt for pt to see Adia tomorrow. Advised, mother to call 911 or take pt back to ER. Mom is declining to do so. Advised, I will send message to provider Adia, to see her recommendations . Advised, mom to f.up w [facility]. Pt did deny any SI, or HI. She denies using any other sub's other than THC Pt appears disorganized, her insight and judegement apear poor, she is unable to tell me about her hospitalization recently for juan j or med hx   Medication Adherence Poor                No data recorded                   Patient History   No past medical history on file.  No past surgical " history on file.  No family history on file.  Social History     Tobacco Use    Smoking status: Not on file    Smokeless tobacco: Not on file   Substance Use Topics    Alcohol use: Not on file    Drug use: Not on file       Physical Exam   ED Triage Vitals [05/22/24 1855]   Temperature Heart Rate Respirations BP   36.8 °C (98.2 °F) (!) 104 18 (!) 187/130      Pulse Ox Temp Source Heart Rate Source Patient Position   99 % Oral -- --      BP Location FiO2 (%)     -- --       Physical Exam  Vitals and nursing note reviewed.   Constitutional:       General: She is not in acute distress.     Appearance: She is well-developed.      Comments: 53-year-old white female who is screaming at staff and hysterical.  Her speech is clear but delusional.  Perseverating that her mother is poisoning her and that [facility] are after her  Moving all 4 extremities   HENT:      Head: Normocephalic and atraumatic.   Eyes:      Conjunctiva/sclera: Conjunctivae normal.   Cardiovascular:      Rate and Rhythm: Normal rate and regular rhythm.      Heart sounds: No murmur heard.  Pulmonary:      Effort: Pulmonary effort is normal. No respiratory distress.      Breath sounds: Normal breath sounds.   Abdominal:      Palpations: Abdomen is soft.      Tenderness: There is no abdominal tenderness.   Musculoskeletal:         General: No swelling.      Cervical back: Neck supple.   Skin:     General: Skin is warm and dry.      Capillary Refill: Capillary refill takes less than 2 seconds.   Neurological:      Mental Status: She is alert.   Psychiatric:         Attention and Perception: She perceives auditory hallucinations.         Mood and Affect: Mood normal.         Behavior: Behavior is agitated and aggressive.         Thought Content: Thought content is paranoid.         Judgment: Judgment is impulsive.         ED Course & MDM   ED Course as of 05/22/24 2248   Wed May 22, 2024   2220 Normal sinus rhythm 96.  Normal EKG   no ischemia.   Similar to  11/6/2015 [JN]      ED Course User Index  [JN] Dio Sanchez MD         Diagnoses as of 05/22/24 2248   Acute psychosis (Multi)   History of schizoaffective disorder       Medical Decision Making      53-year-old female with a history of schizoaffective disorder medical noncompliance brought to the emergency department by the police  She says that her mother has been poisoning her with Seroquel and Protonix.  She denies any headache, chest pain, abdominal pain  She is otherwise not able to give much history as she keeps perseverating  Review of records demonstrates that this is typical of her acute psychosis presentations      Physical Exam  Vitals and nursing note reviewed.   Constitutional:       General: She is not in acute distress.     Appearance: She is well-developed.      Comments: 53-year-old white female who is screaming at staff and hysterical.  Her speech is clear but delusional.  Perseverating that her mother is poisoning her and that [facility] are after her  Moving all 4 extremities   Neurological:      Mental Status: She is alert.   Psychiatric:         Attention and Perception: She perceives auditory hallucinations.         Mood and Affect: Mood normal.         Behavior: Behavior is agitated and aggressive.         Thought Content: Thought content is paranoid.         Judgment: Judgment is impulsive.       Update 10:46 PM  Patient has improved significantly with Inapsine, Benadryl and Ativan.  She is still somewhat hypertensive  But does not have any evidence of an encephalopathy  CMP demonstrates hyponatremia which is chronic acetaminophen, salicylate and alcohol levels are undetectable  Leukocytosis of 15.3 but that is felt to be secondary to stress.  No evidence of a infectious etiology.  She has no temperature EKG is unremarkable she will be getting 10 mg of Valium and we will carefully monitor her blood pressure    She is signed out to the night shift team whose responsibilities  include  Follow-up blood pressure readings  Follow-up with psychiatric evaluation  Disposition    No orders to display       Labs Reviewed   CBC WITH AUTO DIFFERENTIAL - Abnormal       Result Value    WBC 15.3 (*)     nRBC 0.0      RBC 4.04      Hemoglobin 11.3 (*)     Hematocrit 33.7 (*)     MCV 83      MCH 28.0      MCHC 33.5      RDW 15.0 (*)     Platelets 257      Neutrophils % 80.0      Immature Granulocytes %, Automated 0.5      Lymphocytes % 9.1      Monocytes % 10.0      Eosinophils % 0.1      Basophils % 0.3      Neutrophils Absolute 12.21 (*)     Immature Granulocytes Absolute, Automated 0.07      Lymphocytes Absolute 1.39      Monocytes Absolute 1.53 (*)     Eosinophils Absolute 0.02      Basophils Absolute 0.04     COMPREHENSIVE METABOLIC PANEL - Abnormal    Glucose 192 (*)     Sodium 128 (*)     Potassium 4.2      Chloride 92 (*)     Bicarbonate 18 (*)     Urea Nitrogen 14      Creatinine 0.90      eGFR 77      Calcium 9.6      Albumin 4.4      Alkaline Phosphatase 100      Total Protein 7.7      AST 26      Bilirubin, Total 0.3      ALT 15      Anion Gap 18     ACUTE TOXICOLOGY PANEL, BLOOD - Normal    Acetaminophen <5.0      Salicylate  <0      Alcohol <0.010     SARS-COV-2 PCR - Normal    Coronavirus 2019, PCR Not Detected      Narrative:     This assay has received FDA Emergency Use Authorization (EUA) and is only authorized for the duration of time that circumstances exist to justify the authorization of the emergency use of in vitro diagnostic tests for the detection of SARS-CoV-2 virus and/or diagnosis of COVID-19 infection under section 564(b)(1) of the Act, 21 U.S.C. 360bbb-3(b)(1). This assay is an in vitro diagnostic nucleic acid amplification test for the qualitative detection of SARS-CoV-2 from nasopharyngeal specimens and has been validated for use at [facility]. Negative results do not preclude COVID-19 infections and should not be used as the sole basis for  diagnosis, treatment, or other management decisions.     DRUG SCREEN,URINE             Procedure  Procedures     Dio Sanchez MD  05/22/24 3300       Dio Sanchez MD  05/22/24 0012

## 2024-05-23 VITALS
WEIGHT: 136.69 LBS | RESPIRATION RATE: 20 BRPM | OXYGEN SATURATION: 98 % | SYSTOLIC BLOOD PRESSURE: 128 MMHG | HEIGHT: 64 IN | DIASTOLIC BLOOD PRESSURE: 98 MMHG | HEART RATE: 95 BPM | BODY MASS INDEX: 23.34 KG/M2 | TEMPERATURE: 98.2 F

## 2024-05-23 LAB
AMPHETAMINES UR QL SCN>1000 NG/ML: NEGATIVE
APPEARANCE UR: CLEAR
BARBITURATES UR QL SCN>300 NG/ML: NEGATIVE
BENZODIAZ UR QL SCN>300 NG/ML: NEGATIVE
BILIRUB UR STRIP.AUTO-MCNC: NEGATIVE MG/DL
BZE UR QL SCN>300 NG/ML: NEGATIVE
CANNABINOIDS UR QL SCN>50 NG/ML: POSITIVE
COLOR UR: ABNORMAL
FENTANYL+NORFENTANYL UR QL SCN: NEGATIVE
GLUCOSE UR STRIP.AUTO-MCNC: NORMAL MG/DL
KETONES UR STRIP.AUTO-MCNC: ABNORMAL MG/DL
LEUKOCYTE ESTERASE UR QL STRIP.AUTO: NEGATIVE
METHADONE UR QL SCN>300 NG/ML: NEGATIVE
NITRITE UR QL STRIP.AUTO: NEGATIVE
OPIATES UR QL SCN>300 NG/ML: NEGATIVE
OXYCODONE UR QL: NEGATIVE
PCP UR QL SCN>25 NG/ML: NEGATIVE
PH UR STRIP.AUTO: 5.5 [PH]
PROT UR STRIP.AUTO-MCNC: NEGATIVE MG/DL
RBC # UR STRIP.AUTO: NEGATIVE /UL
SP GR UR STRIP.AUTO: 1.01
UROBILINOGEN UR STRIP.AUTO-MCNC: NORMAL MG/DL

## 2024-05-23 PROCEDURE — 81003 URINALYSIS AUTO W/O SCOPE: CPT | Performed by: EMERGENCY MEDICINE

## 2024-05-23 PROCEDURE — 2500000004 HC RX 250 GENERAL PHARMACY W/ HCPCS (ALT 636 FOR OP/ED): Performed by: EMERGENCY MEDICINE

## 2024-05-23 PROCEDURE — 80307 DRUG TEST PRSMV CHEM ANLYZR: CPT | Performed by: EMERGENCY MEDICINE

## 2024-05-23 PROCEDURE — 96372 THER/PROPH/DIAG INJ SC/IM: CPT | Performed by: EMERGENCY MEDICINE

## 2024-05-23 PROCEDURE — 87086 URINE CULTURE/COLONY COUNT: CPT | Mod: WESLAB | Performed by: EMERGENCY MEDICINE

## 2024-05-23 RX ORDER — OLANZAPINE 10 MG/2ML
5 INJECTION, POWDER, FOR SOLUTION INTRAMUSCULAR ONCE
Status: COMPLETED | OUTPATIENT
Start: 2024-05-23 | End: 2024-05-23

## 2024-05-23 RX ORDER — ACETAMINOPHEN 325 MG/1
650 TABLET ORAL EVERY 4 HOURS PRN
Status: DISCONTINUED | OUTPATIENT
Start: 2024-05-23 | End: 2024-05-23 | Stop reason: HOSPADM

## 2024-05-23 RX ORDER — OLANZAPINE 5 MG/1
5 TABLET, ORALLY DISINTEGRATING ORAL ONCE
Status: COMPLETED | OUTPATIENT
Start: 2024-05-23 | End: 2024-05-23

## 2024-05-23 RX ORDER — OLANZAPINE 5 MG/1
5 TABLET ORAL ONCE
Status: COMPLETED | OUTPATIENT
Start: 2024-05-23 | End: 2024-05-23

## 2024-05-23 RX ADMIN — OLANZAPINE 5 MG: 10 INJECTION, POWDER, FOR SOLUTION INTRAMUSCULAR at 05:17

## 2024-05-23 SDOH — HEALTH STABILITY: MENTAL HEALTH: CONTENT: DELUSIONS

## 2024-05-23 SDOH — HEALTH STABILITY: MENTAL HEALTH: BEHAVIORS/MOOD: AGITATED;ANXIOUS;DELUSIONS;INAPPROPRIATE;HYPER-VERBAL;IMPULSIVE;PARANOID;PACING;RESTLESS;UNCOOPERATIVE

## 2024-05-23 SDOH — HEALTH STABILITY: MENTAL HEALTH: SLEEP PATTERN: INSOMNIA

## 2024-05-23 ASSESSMENT — PAIN SCALES - GENERAL: PAINLEVEL_OUTOF10: 0 - NO PAIN

## 2024-05-23 ASSESSMENT — PAIN - FUNCTIONAL ASSESSMENT: PAIN_FUNCTIONAL_ASSESSMENT: 0-10

## 2024-05-23 NOTE — PROGRESS NOTES
Prabha Mark is a 53 y.o. female initially evaluated by the evening team with Dr. Sanchez for acute delusions and hallucinations.  She had been treated with IM medications and medically cleared for transfer to a psychiatric facility to manage her acute psychosis.  She is currently medically cleared and pending placement.      Overnight the patient was calm and sleeping.  At about 5 AM she started to show signs of delusions and hallucinations once more.  Patient was then treated with Zyprexa.  Her lab work has been reviewed and is only remarkable for cannabis on her drug tox, but otherwise unremarkable.  Patient has a significantly elevated blood pressure in the system.  Repeat vitals after treatment with Zyprexa are improved.  She is now placed on Geodon twice daily with meals to manage her acute psychosis which is likely from schizophrenia or schizoaffective disorder and possibly complicated by the cannabis.  She continues to be medically cleared

## 2024-05-23 NOTE — PROGRESS NOTES
EPAT - Social Work Psychiatric Assessment    Arrival Details  Mode of Arrival: Ambulance  Admission Source: Home  Admission Type: Involuntary  EPAT Assessment Start Date: 05/22/24  EPAT Assessment Start Time: 2300  Name of : Roxanne SRINIVASAN [facility]    History of Present Illness  Admission Reason: Psychosis  HPI: Pt is a 53 y.o. female who was pink slipped to the ED by [facility]. Pt's mother called [facility] stating that she did not feel safe with the pt in her home and that the pt needs admission due to worsening paranoia and decompensation. Pt has a lengthy hx of schizoaffective disorder. She receives outpatient care from [facility] and her last psych admission was to [facility] on May 7th.     Readmission Information   Readmission within 30 Days: Yes  Previous ED Visit Date and Reason : 5/7/2024 for similar presentation  Previous Discharge Date and Location: Dsicharged from [facility]    Psychiatric Symptoms  Anxiety Symptoms: Panic attack, Generalized, Obsessions  Depression Symptoms: No problems reported or observed.  Grisel Symptoms: No problems reported or observed.    Psychosis Symptoms  Hallucination Type: Auditory  Delusion Type: Paranoid, Persecutory, Controlling    Additional Symptoms - Adult  Generalized Anxiety Disorder: Excessive anxiety/worry, Irritability, Restlessness, Sleep disturbance, Difficulity concentrating  Obsessive Compulsive Disorder: No problems reported or observed.  Panic Attack: Shortness of breath, Dizzy  Post Traumatic Stress Disorder: No problems reported or observed.  Delirium: Acute inattention, Disorientation, Waxing and waning    Past Psychiatric History/Meds/Treatments  Past Psychiatric History: Pt has a lengthy hx of Schizoaffective Disorder and CANDY.  Past Psychiatric Meds/Treatments: Pt has had multiple psych admissions, last one being May 7th to [facility]. Pt receives outpatient care at [facility]    Living Arrangement: House, Lives with someone    Home Safety  Feels Safe  Living in Home: No    Income Information  Employment Status for: Patient  Employment Status: Disabled, Unemployed  Income Source: Disability    [place]ilt[place] Service/Education History  Current or Previous  Service: None    Drug Screening  Have you used any substances (canabis, cocaine, heroin, hallucinogens, inhalants, etc.) in the past 12 months?: No  Have you used any prescription drugs other than prescribed in the past 12 months?: No  Is a toxicology screen needed?: Yes    Stage of Change  Stage of Change: Precontemplation  History of Treatment: Inpatient, IOP, Dual  Type of Treatment Offered: Inpatient  Treatment Offered: Declined    Psychosocial  Psychosocial (WDL): Exceptions to WDL  Behaviors/Mood: Agitated, Anxious, Aggressive verbally, others, Combative, Guarded, Irritable, Paranoid  Affect: Unable to assess  Family Behaviors: Non-supportive    Orientation  Orientation Level: Disoriented to situation, Inappropriate for developmental age    General Appearance  Motor Activity: Agitation, Restlessness  Speech Pattern: Excessively loud, Perseverative, Repetitive  General Attitude: Defensive, Guarded, Hostile  Appearance/Hygiene: Disheveled    Thought Process  Coherency: Disorganized, Tangential  Content: Blaming others, Ambivalence, Delusions  Delusions: Controlling, Paranoid, Persecutory  Perception: Hallucinations  Hallucination: Auditory  Judgment/Insight: Impaired  Confusion: Moderate  Cognition: Poor attention/concentration, Unable to follow commands    Sleep Pattern  Sleep Pattern: Insomnia    Risk Factors  Self Harm/Suicidal Ideation Plan: Denies    Violence Risk Assessment  Assessment of Violence: None noted  Thoughts of Harm to Others: No    Ask Suicide-Screening Questions  1. In the past few weeks, have you wished you were dead?: No  2. In the past few weeks, have you felt that you or your family would be better off if you were dead?: No  3. In the past week, have you been having thoughts about  "killing yourself?: No  4. Have you ever tried to kill yourself?: No  5. Are you having thoughts of killing yourself right now?: No  Calculated Risk Score: No intervention is necessary  [place] Suicide Severity Rating Scale (Screener/Recent Self-Report)  1. Wish to be Dead (Past 1 Month): No  2. Non-Specific Active Suicidal Thoughts (Past 1 Month): No  6. Suicidal Behavior (Lifetime): No  Calculated C-SSRS Risk Score (Lifetime/Recent): No Risk Indicated  Step 1: Risk Factors  Current & Past Psychiatric Dx: Mood disorder, Psychotic disorder, Alcohol/substance abuse disorders, Conduct problems (antisocial behavior, aggression, impulsivity)  Presenting Symptoms: Anxiety and/or panic, Psychosis  Change in Treatment: Non-compliant or not receiving treatment    Psychiatric Impression and Plan of Care  Assessment and Plan: Pt was A&Ox2 for EPAT assessment. She is orientated to herself and place but completely disorientated to situation and time. Pt's affect was hostile, guarded, and uncooperative. She exhibited extreme paranoia and suspicion about everything. She had to be medicated several times due to being verbally aggressive towards ED staff. This SW witnessed ED RN giving pt a Valium and pt states \" this isn't Valium! I know what Valium looks like and this isn't it!\" Pt's speech was at times nonsensical and tangetial, jumping from one delusion to the next. At times pt would also stare at you and refuse to answer questions. Pt currently denies any SI/HI, however she does appear internally stimulated and hears things that aren't there. Pt has very strong, paranoid delusions. She believes that her mother is poisoning her with her Seroquel prescription. She also perservates on someone named \"[employer]\" who she believes is trying to \"download and take her.\" Pt has historically poor medication compliance. She believes the psych medication is what is making her psychotic. She also exhibits extreme anxiety if confronted about her " "delusions. During the assessment pt states \"omg I'm dying! My heart is racing I'm having a panic attack!\" Pt was pink slipped after pt's mother called [facility] stating that she did not feel safe having the pt in her home anymore and that the pt is \"decompensating.\"  Plan Comments: Refer pt for psych admission    Outcome/Disposition  Assessment, Recommendations and Risk Level Reviewed with: ED RN and ED Physician  Contact Name: Guera Mark  Contact Number(s): [phone]  Contact Relationship: Mother  EPAT Assessment Completed Date: 05/23/24  EPAT Assessment Completed Time: 0000  Patient Disposition: Out of network facility (Specify)  Out of Network Reason: [facility] unit at capability capacity    Social Work Note  "

## 2024-05-24 LAB — BACTERIA UR CULT: ABNORMAL

## 2024-05-29 LAB
ALBUMIN SERPL-MCNC: 3.6 G/DL (ref 3.5–5.2)
ALP SERPL-CCNC: 82 U/L (ref 35–104)
ALT SERPL-CCNC: 12 U/L (ref 0–32)
ANION GAP SERPL CALCULATED.3IONS-SCNC: 13 MMOL/L (ref 7–16)
AST SERPL-CCNC: 19 U/L (ref 0–31)
BASOPHILS # BLD: 0.02 K/UL (ref 0–0.2)
BASOPHILS NFR BLD: 0 % (ref 0–2)
BILIRUB SERPL-MCNC: 0.2 MG/DL (ref 0–1.2)
BUN SERPL-MCNC: 10 MG/DL (ref 6–20)
CALCIUM SERPL-MCNC: 8.9 MG/DL (ref 8.6–10.2)
CHLORIDE SERPL-SCNC: 105 MMOL/L (ref 98–107)
CO2 SERPL-SCNC: 19 MMOL/L (ref 22–29)
CREAT SERPL-MCNC: 0.6 MG/DL (ref 0.5–1)
EOSINOPHIL # BLD: 0.1 K/UL (ref 0.05–0.5)
EOSINOPHILS RELATIVE PERCENT: 2 % (ref 0–6)
ERYTHROCYTE [DISTWIDTH] IN BLOOD BY AUTOMATED COUNT: 15.8 % (ref 11.5–15)
GFR, ESTIMATED: >90 ML/MIN/1.73M2
GLUCOSE SERPL-MCNC: 101 MG/DL (ref 74–99)
HCT VFR BLD AUTO: 30.2 % (ref 34–48)
HGB BLD-MCNC: 9.7 G/DL (ref 11.5–15.5)
IMM GRANULOCYTES # BLD AUTO: <0.03 K/UL (ref 0–0.58)
IMM GRANULOCYTES NFR BLD: 0 % (ref 0–5)
LYMPHOCYTES NFR BLD: 1.21 K/UL (ref 1.5–4)
LYMPHOCYTES RELATIVE PERCENT: 27 % (ref 20–42)
MCH RBC QN AUTO: 28 PG (ref 26–35)
MCHC RBC AUTO-ENTMCNC: 32.1 G/DL (ref 32–34.5)
MCV RBC AUTO: 87.3 FL (ref 80–99.9)
MONOCYTES NFR BLD: 0.57 K/UL (ref 0.1–0.95)
MONOCYTES NFR BLD: 13 % (ref 2–12)
NEUTROPHILS NFR BLD: 57 % (ref 43–80)
NEUTS SEG NFR BLD: 2.55 K/UL (ref 1.8–7.3)
PLATELET # BLD AUTO: 251 K/UL (ref 130–450)
PMV BLD AUTO: 10.8 FL (ref 7–12)
POTASSIUM SERPL-SCNC: 4.6 MMOL/L (ref 3.5–5)
PROT SERPL-MCNC: 6.2 G/DL (ref 6.4–8.3)
RBC # BLD AUTO: 3.46 M/UL (ref 3.5–5.5)
VALPROATE SERPL-MCNC: 59 UG/ML (ref 50–100)
WBC OTHER # BLD: 4.5 K/UL (ref 4.5–11.5)

## 2024-09-13 NOTE — PROGRESS NOTES
[facility] Pain Management  New Patient Office Visit Note 2024    Patient Information: Prabha Mark, MRN: [id number], : 1970   Primary Care/Referring Physician: Ariella Hall MD, [address] [facility] [place] [facility]     Chief Complaint: Low and mid back pain  History of Pain: Ms. Prabha Mark is a 54 y.o. female with a PMHx of chronic pancreatitis, schizoaffective disorder, PTSD, anxiety, hx of alcohol use disorder who presents for mid and low back pain.     She describes pain primarily in a band in her low back with occasional radiation down the back of her left leg, as well as pain between her shoulder blades. She has some tingling/numbness in her hands and feet. This pain is fairly constant and she has difficulty identifying triggering factors. Currently her pain is manageable and she does not feel that additional intervention is needed at this time. She needs a new pain management provider because Dr. Roque is no longer taking her insurance. He was prescribing her Pregabalin and Cyclobenzaprine with benefit.     I am able to see an MRI lumbar spine from 2015 which shows laminectomies and poster fusion at L3-L5, (appears to have ?sacralization of L5). Dural ectasia noted and mild arachnoiditis. She has reportedly had additional surgeries since this, and thinks she had an MRI more recently at [facility], but I do not see this in our system.     Of note, she was previously having issues with abdominal pain thought to be due to chronic pancreatitis but this has since improved. She had gotten a celiac plexus block from Dr. Roque for this with benefit.     Current Pain Medications: Pregabalin 150 mg TID, Cyclobenzaprine 10 mg TID PRN, Voltaren gel, uses medical THC  Previously Tried Pain Medications: Amitrypitline, Baclofen, Gabapentin, PO steroids    Relevant Surgeries: Reports undergoing 5 lumbar spine surgeries (details unclear) and ?C5-7 ACDF  Injections: She has  undergone  injections with Dr. Roque in the past (celiac plexus blocks, caudal ARDEN, lumbar TFESI, lumbar mbb's)  Physical/Occupational Therapy: No recent PT    Medications:   Current Outpatient Medications   Medication Instructions    cyclobenzaprine (FLEXERIL) 10 mg, oral, 3 times daily PRN    pregabalin (LYRICA) 150 mg, oral, 3 times daily      Allergies:   Allergies   Allergen Reactions    Tetracycline Other     Pt. Stated unknown reaction happened as a child    Betadine Surgi-Prep Other     Pt. Stated unknown reaction happened as a child       Past Medical & Surgical History:  Past Medical History:   Diagnosis Date    COPD (chronic obstructive pulmonary disease) (Multi)     Malnutrition (Multi)       Past Surgical History:   Procedure Laterality Date    BACK SURGERY      LAMINECTOMY      LEG SURGERY Right        No family history on file.  Social History     Socioeconomic History    Marital status: Single     Spouse name: Not on file    Number of children: Not on file    Years of education: Not on file    Highest education level: Not on file   Occupational History    Not on file   Tobacco Use    Smoking status: Former     Types: Cigarettes    Smokeless tobacco: Never   Substance and Sexual Activity    Alcohol use: Never    Drug use: Yes     Types: Marijuana     Comment: medical marijuana    Sexual activity: Not on file   Other Topics Concern    Not on file   Social History Narrative    Not on file     Social Determinants of Health     Financial Resource Strain: Not on File (5/21/2021)    Received from [facility]    Financial Resource Strain     Financial Resource Strain: 0   Food Insecurity: Not on file (9/3/2024)   Transportation Needs: Not on File (5/21/2021)    Received from [facility]    Transportation Needs     Transportation: 0   Physical Activity: Not on File (5/21/2021)    Received from [facility]    Physical Activity     Physical Activity: 0   Stress: Not on File (5/21/2021)    Received from  "[facility]    Stress     Stress: 0   Social Connections: Not on File (9/3/2024)    Received from [facility]    Social Connections     Connectedness: 0   Intimate Partner Violence: Not on file   Housing Stability: Not on File (2021)    Received from [facility]    Housing Stability     Housin       Problems, Past medical history, past surgical history, Medications, allergies, social and family history reviewed and as per the electronic medical record from today's encounter    Review of Systems:  CONST: No fever, chills, fatigue, weight changes  EYES: No loss of vision  ENT: No hearing loss, tinnitus  CV: No chest pain, palpitations  RESP: No dyspnea, shortness of breath, cough  GI: No stool incontinence, nausea, vomiting  : No urinary incontinence  MSK: No joint swelling  SKIN: No rash, no hives  NEURO: No headache, dizziness  PSYCH: Hx of PTSD, schizoaffective disorder, anxiety  HEM/LYMPH: No easy bruising or bleeding  All other systems reviewed are negative     Physical Exam:  Vitals: /77   Pulse 91   Resp 18   Ht 1.626 m (5' 4\")   Wt 61.7 kg (136 lb)   SpO2 97%   BMI 23.34 kg/m²   General: No apparent distress. Alert, appropriate, oriented x 3. Mood and affect normal. Speaking in full sentences.  HENT: Normocephalic, atraumatic. Hearing intact.  Eyes: Extraocular movements grossly intact. Pupils equal and round.   Neck: Supple, trachea midline.  Lungs: Symmetric respiratory excursion on visual exam, nonlabored breathing.  Extremities: No clubbing, cyanosis, or edema noted in arms or legs.  Skin: No rashes, lesions, alopecia noted on back or extremities. Large midline vertical scar from lumbosacral junction to thoracolumbar junction  Back: No spasm noted over musculature. No misalignment or asymmetry noted.  Neuro: Alert and appropriate. Motor strength 5/5 throughout bilateral lower extremities. Sensory intact to light touch bilateral lower extremities. Gait within normal limits. Bulk and tone " "within normal limits.    Laboratory Data:  The following laboratory data were reviewed during this visit:   Lab Results   Component Value Date    WBC 15.3 (H) 05/22/2024    RBC 4.04 05/22/2024    HGB 11.3 (L) 05/22/2024    HCT 33.7 (L) 05/22/2024     05/22/2024      No results found for: \"INR\"  Lab Results   Component Value Date    CREATININE 0.90 05/22/2024       Imaging:  The following imaging impressions were reviewed by me during this visit:    -No recent relevant imaging for review    I also personally reviewed the images from the above studies myself. These images and my interpretation of them contributed to the management and decision making of the patient's medical plan.    ASSESSMENT:  Ms. Prabha Mark is a 54 y.o. female with low/mid back pain that is consistent with:    1. Postlaminectomy syndrome of lumbar region    2. Myofascial pain        PLAN:    Diagnostics:   - She has reportedly had additional lumbar spine imaging (?MRI) performed in the past few years which are not visible in Epic. I recommend she try to find these and bring them on a disc for me to review.     Physical Therapy and Rehabilitation:     - Continue your current HEP    Psychologically:  - Continue following with your behavioral health provider    Medications  - Continue Pregabalin 150 mg TID. Refills provided today  - Continue Cyclobenzaprine 10 mg TID PRN. Refills provided today    Duration  - Multiple years    Interventions:  - I suspect his pain is multi-factorial secondary to lumbar radiculopathy, lumbar facet arthropathy, and myofascial pain, with possible contributions from chronic pancreatitis (abdominal and shoulder blade pain).  I currently do not have a complete record of her lumbar spine surgeries, nor do I have any imaging since her most recent lumbar spine surgery.  I will attempt to gather additional records prior to considering any additional intervention.        Sincerely,  Dylan Noriega MD  [facility] Pain " Management - [place]

## 2024-09-17 ENCOUNTER — OFFICE VISIT (OUTPATIENT)
Dept: PAIN MEDICINE | Facility: CLINIC | Age: 54
End: 2024-09-17
Payer: MEDICARE

## 2024-09-17 VITALS
WEIGHT: 136 LBS | DIASTOLIC BLOOD PRESSURE: 77 MMHG | SYSTOLIC BLOOD PRESSURE: 116 MMHG | BODY MASS INDEX: 23.22 KG/M2 | OXYGEN SATURATION: 97 % | HEIGHT: 64 IN | HEART RATE: 91 BPM | RESPIRATION RATE: 18 BRPM

## 2024-09-17 DIAGNOSIS — M54.16 LUMBAR RADICULOPATHY: ICD-10-CM

## 2024-09-17 DIAGNOSIS — M47.816 LUMBAR FACET ARTHROPATHY: ICD-10-CM

## 2024-09-17 DIAGNOSIS — M96.1 POSTLAMINECTOMY SYNDROME OF LUMBAR REGION: Primary | ICD-10-CM

## 2024-09-17 DIAGNOSIS — M79.18 MYOFASCIAL PAIN: ICD-10-CM

## 2024-09-17 PROCEDURE — 99214 OFFICE O/P EST MOD 30 MIN: CPT | Performed by: STUDENT IN AN ORGANIZED HEALTH CARE EDUCATION/TRAINING PROGRAM

## 2024-09-17 PROCEDURE — 3008F BODY MASS INDEX DOCD: CPT | Performed by: STUDENT IN AN ORGANIZED HEALTH CARE EDUCATION/TRAINING PROGRAM

## 2024-09-17 PROCEDURE — 99204 OFFICE O/P NEW MOD 45 MIN: CPT | Performed by: STUDENT IN AN ORGANIZED HEALTH CARE EDUCATION/TRAINING PROGRAM

## 2024-09-17 PROCEDURE — 1036F TOBACCO NON-USER: CPT | Performed by: STUDENT IN AN ORGANIZED HEALTH CARE EDUCATION/TRAINING PROGRAM

## 2024-09-17 RX ORDER — PREGABALIN 200 MG/1
200 CAPSULE ORAL 3 TIMES DAILY
COMMUNITY
End: 2024-09-17 | Stop reason: SDUPTHER

## 2024-09-17 RX ORDER — PREGABALIN 150 MG/1
150 CAPSULE ORAL 3 TIMES DAILY
Qty: 90 CAPSULE | Refills: 1 | Status: SHIPPED | OUTPATIENT
Start: 2024-09-17 | End: 2024-11-16

## 2024-09-17 RX ORDER — CYCLOBENZAPRINE HCL 10 MG
10 TABLET ORAL 3 TIMES DAILY PRN
Qty: 90 TABLET | Refills: 1 | Status: SHIPPED | OUTPATIENT
Start: 2024-09-17

## 2024-09-17 RX ORDER — CYCLOBENZAPRINE HCL 10 MG
10 TABLET ORAL 3 TIMES DAILY PRN
COMMUNITY
End: 2024-09-17 | Stop reason: SDUPTHER

## 2024-09-17 ASSESSMENT — PATIENT HEALTH QUESTIONNAIRE - PHQ9
2. FEELING DOWN, DEPRESSED OR HOPELESS: NOT AT ALL
1. LITTLE INTEREST OR PLEASURE IN DOING THINGS: NOT AT ALL
SUM OF ALL RESPONSES TO PHQ9 QUESTIONS 1 AND 2: 0

## 2024-09-17 ASSESSMENT — PAIN - FUNCTIONAL ASSESSMENT: PAIN_FUNCTIONAL_ASSESSMENT: 0-10

## 2024-09-17 ASSESSMENT — PAIN SCALES - GENERAL
PAINLEVEL: 5
PAINLEVEL_OUTOF10: 5 - MODERATE PAIN

## 2024-09-17 ASSESSMENT — LIFESTYLE VARIABLES: TOTAL SCORE: 6

## 2024-09-17 ASSESSMENT — PAIN DESCRIPTION - DESCRIPTORS: DESCRIPTORS: ACHING

## 2024-10-13 NOTE — PROGRESS NOTES
[facility]  Follow Up Office Visit Note 10/15/2024    Patient Information: Prabha Mark, MRN: [id number], : 1970   Primary Care/Referring Physician: Ariella Hall MD, [address] [facility] / [place] [facility]     Chief Complaint: Low and mid back pain    Interval History: Ms. Mark presents today for follow up. At her last visit I requested she bring her recent lumbar spine imaging for me to review    Today she reports doing slightly worse since last seen. She has been noticing more left low back/buttock pain. She forgot to bring her imaging with her today.     Brief History of Pain: Ms. Prabha Mark is a 54 y.o. female with a PMHx of chronic pancreatitis, schizoaffective disorder, PTSD, anxiety, hx of alcohol use disorder who presents for mid and low back pain.     She describes pain primarily in a band in her low back with occasional radiation down the back of her left leg, as well as pain between her shoulder blades. She has some tingling/numbness in her hands and feet. This pain is fairly constant and she has difficulty identifying triggering factors. Currently her pain is manageable and she does not feel that additional intervention is needed at this time. She needs a new pain management provider because Dr. Roque is no longer taking her insurance. He was prescribing her Pregabalin and Cyclobenzaprine with benefit.     I am able to see an MRI lumbar spine from  which shows laminectomies and poster fusion at L3-L5, (appears to have ?sacralization of L5). Dural ectasia noted and mild arachnoiditis. She has reportedly had additional surgeries since this, and thinks she had an MRI more recently at [facility], but I do not see this in our system.     Of note, she was previously having issues with abdominal pain thought to be due to chronic pancreatitis but this has since improved. She had gotten a celiac plexus block from Dr. Roque for this with benefit.      Current Pain Medications: Pregabalin 150 mg TID, Cyclobenzaprine 10 mg TID PRN, Voltaren gel, uses medical THC  Previously Tried Pain Medications: Amitrypitline, Baclofen, Gabapentin, PO steroids    Relevant Surgeries: Reports undergoing 5 lumbar spine surgeries (details unclear) and ?C5-7 ACDF  Injections: She has undergone  injections with Dr. Roque in the past (celiac plexus blocks, caudal ARDEN, lumbar TFESI, lumbar mbb's)  Physical/Occupational Therapy: No recent PT but has completed extensive PT in the past    Medications:   Current Outpatient Medications   Medication Instructions    cyclobenzaprine (FLEXERIL) 10 mg, oral, 3 times daily PRN    pregabalin (LYRICA) 150 mg, oral, 3 times daily      Allergies:   Allergies   Allergen Reactions    Tetracycline Other     Pt. Stated unknown reaction happened as a child    Betadine Surgi-Prep Other     Pt. Stated unknown reaction happened as a child       Past Medical & Surgical History:  Past Medical History:   Diagnosis Date    COPD (chronic obstructive pulmonary disease) (Multi)     Malnutrition (Multi)       Past Surgical History:   Procedure Laterality Date    BACK SURGERY      LAMINECTOMY      LEG SURGERY Right        No family history on file.  Social History     Socioeconomic History    Marital status: Single     Spouse name: Not on file    Number of children: Not on file    Years of education: Not on file    Highest education level: Not on file   Occupational History    Not on file   Tobacco Use    Smoking status: Former     Types: Cigarettes    Smokeless tobacco: Never   Substance and Sexual Activity    Alcohol use: Never    Drug use: Yes     Types: Marijuana     Comment: medical marijuana    Sexual activity: Not on file   Other Topics Concern    Not on file   Social History Narrative    Not on file     Social Determinants of Health     Financial Resource Strain: Not on File (5/21/2021)    Received from [facility]    Financial Resource Strain      "Financial Resource Strain: 0   Food Insecurity: Not on File (2024)    Received from [facility]    Food Insecurity     Food: 0   Transportation Needs: Not on File (2021)    Received from [facility]    Transportation Needs     Transportation: 0   Physical Activity: Not on File (2021)    Received from [facility]    Physical Activity     Physical Activity: 0   Stress: Not on File (2021)    Received from [facility]    Stress     Stress: 0   Social Connections: Not on File (9/3/2024)    Received from [facility]    Social Connections     Connectedness: 0   Intimate Partner Violence: Not on file   Housing Stability: Not on File (2021)    Received from [facility]    Housing Stability     Housin       Problems, Past medical history, past surgical history, Medications, allergies, social and family history reviewed and as per the electronic medical record from today's encounter    Review of Systems:  CONST: No fever, chills, fatigue, weight changes  EYES: No loss of vision  ENT: No hearing loss, tinnitus  CV: No chest pain, palpitations  RESP: No dyspnea, shortness of breath, cough  GI: No stool incontinence, nausea, vomiting  : No urinary incontinence  MSK: No joint swelling  SKIN: No rash, no hives  NEURO: No headache, dizziness  PSYCH: Hx of PTSD, schizoaffective disorder, anxiety  HEM/LYMPH: No easy bruising or bleeding  All other systems reviewed are negative     Physical Exam:  Vitals: /76   Pulse 73   Resp 18   Ht 1.626 m (5' 4\")   Wt 61.7 kg (136 lb)   SpO2 99%   BMI 23.34 kg/m²   General: No apparent distress. Alert, appropriate, oriented x 3. Mood and affect normal. Speaking in full sentences.  HENT: Normocephalic, atraumatic. Hearing intact.  Eyes: Extraocular movements grossly intact. Pupils equal and round.   Neck: Supple, trachea midline.  Lungs: Symmetric respiratory excursion on visual exam, nonlabored breathing.  Extremities: No clubbing, cyanosis, or edema noted in arms " "or legs.  Skin: No rashes, lesions, alopecia noted on back or extremities. Large midline vertical scar from lumbosacral junction to thoracolumbar junction  Back: No spasm noted over musculature. No misalignment or asymmetry noted. GINGER test positive on the left, Gaenslen's test positive on the left, sacral compression test positive on the left  Neuro: Alert and appropriate. Gait within normal limits. Bulk and tone within normal limits.    Laboratory Data:  The following laboratory data were reviewed during this visit:   Lab Results   Component Value Date    WBC 15.3 (H) 05/22/2024    RBC 4.04 05/22/2024    HGB 11.3 (L) 05/22/2024    HCT 33.7 (L) 05/22/2024     05/22/2024      No results found for: \"INR\"  Lab Results   Component Value Date    CREATININE 0.90 05/22/2024       Imaging:  The following imaging impressions were reviewed by me during this visit:    -No recent relevant imaging for review    I also personally reviewed the images from the above studies myself. These images and my interpretation of them contributed to the management and decision making of the patient's medical plan.    ASSESSMENT:  Ms. Prabha Mark is a 54 y.o. female with low/mid back pain that is consistent with:    1. Arthropathy of left sacroiliac joint    2. Postlaminectomy syndrome of lumbar region    3. Lumbar radiculopathy    4. Lumbar facet arthropathy    5. Myofascial pain          PLAN:    Diagnostics:   - She has reportedly had additional lumbar spine imaging (?MRI) performed in the past few years which are not visible in Epic. I recommend she try to find these and bring them on a disc for me to review.     Physical Therapy and Rehabilitation:     - Continue your current HEP    Psychologically:  - Continue following with your behavioral health provider    Medications  - Continue Pregabalin 150 mg TID  - Continue Cyclobenzaprine 10 mg TID PRN    Duration  - Multiple years    Interventions:  - I suspect his pain is " multi-factorial secondary to left sacroiliac joint arthropathy, lumbar radiculopathy, lumbar facet arthropathy, and myofascial pain, with possible contributions from chronic pancreatitis (abdominal and shoulder blade pain).  Currently her worst pain is in the left low back and buttock, most consistent with sacroiliac joint mediated pain. She has pain with multiple SI joint provocative maneuvers on exam today. I recommend a left sacroiliac joint injection utilizing live fluoroscopy and PO sedation. Risks, benefits, alternatives discussed. She would like to proceed.         Sincerely,  Dylan Noriega MD  [facility] - [place]

## 2024-10-15 ENCOUNTER — OFFICE VISIT (OUTPATIENT)
Dept: PAIN MEDICINE | Facility: CLINIC | Age: 54
End: 2024-10-15
Payer: MEDICARE

## 2024-10-15 VITALS
DIASTOLIC BLOOD PRESSURE: 76 MMHG | HEART RATE: 73 BPM | OXYGEN SATURATION: 99 % | HEIGHT: 64 IN | WEIGHT: 136 LBS | BODY MASS INDEX: 23.22 KG/M2 | SYSTOLIC BLOOD PRESSURE: 132 MMHG | RESPIRATION RATE: 18 BRPM

## 2024-10-15 DIAGNOSIS — M96.1 POSTLAMINECTOMY SYNDROME OF LUMBAR REGION: ICD-10-CM

## 2024-10-15 DIAGNOSIS — M47.816 LUMBAR FACET ARTHROPATHY: ICD-10-CM

## 2024-10-15 DIAGNOSIS — M47.818 ARTHROPATHY OF LEFT SACROILIAC JOINT: Primary | ICD-10-CM

## 2024-10-15 DIAGNOSIS — M54.16 LUMBAR RADICULOPATHY: ICD-10-CM

## 2024-10-15 DIAGNOSIS — M79.18 MYOFASCIAL PAIN: ICD-10-CM

## 2024-10-15 PROCEDURE — 3008F BODY MASS INDEX DOCD: CPT | Performed by: STUDENT IN AN ORGANIZED HEALTH CARE EDUCATION/TRAINING PROGRAM

## 2024-10-15 PROCEDURE — 99214 OFFICE O/P EST MOD 30 MIN: CPT | Performed by: STUDENT IN AN ORGANIZED HEALTH CARE EDUCATION/TRAINING PROGRAM

## 2024-10-15 PROCEDURE — 1036F TOBACCO NON-USER: CPT | Performed by: STUDENT IN AN ORGANIZED HEALTH CARE EDUCATION/TRAINING PROGRAM

## 2024-10-15 ASSESSMENT — PATIENT HEALTH QUESTIONNAIRE - PHQ9
1. LITTLE INTEREST OR PLEASURE IN DOING THINGS: NOT AT ALL
SUM OF ALL RESPONSES TO PHQ9 QUESTIONS 1 AND 2: 0
2. FEELING DOWN, DEPRESSED OR HOPELESS: NOT AT ALL

## 2024-10-15 ASSESSMENT — PAIN DESCRIPTION - DESCRIPTORS: DESCRIPTORS: ACHING

## 2024-10-15 ASSESSMENT — PAIN SCALES - GENERAL
PAINLEVEL: 7
PAINLEVEL_OUTOF10: 7

## 2024-10-15 ASSESSMENT — PAIN - FUNCTIONAL ASSESSMENT: PAIN_FUNCTIONAL_ASSESSMENT: 0-10

## 2024-10-31 ENCOUNTER — TELEPHONE (OUTPATIENT)
Dept: PAIN MEDICINE | Facility: CLINIC | Age: 54
End: 2024-10-31
Payer: MEDICARE

## 2024-11-01 ENCOUNTER — TELEPHONE (OUTPATIENT)
Dept: PAIN MEDICINE | Facility: CLINIC | Age: 54
End: 2024-11-01
Payer: MEDICARE

## 2024-11-22 DIAGNOSIS — M96.1 POSTLAMINECTOMY SYNDROME OF LUMBAR REGION: ICD-10-CM

## 2024-11-25 RX ORDER — PREGABALIN 150 MG/1
150 CAPSULE ORAL 3 TIMES DAILY
Qty: 90 CAPSULE | Refills: 1 | Status: SHIPPED | OUTPATIENT
Start: 2024-11-25 | End: 2025-01-24

## 2024-12-31 DIAGNOSIS — M96.1 POSTLAMINECTOMY SYNDROME OF LUMBAR REGION: ICD-10-CM

## 2025-01-02 RX ORDER — PREGABALIN 150 MG/1
150 CAPSULE ORAL 3 TIMES DAILY
Qty: 90 CAPSULE | Refills: 1 | Status: SHIPPED | OUTPATIENT
Start: 2025-01-02 | End: 2025-03-03

## 2025-02-11 DIAGNOSIS — M96.1 POSTLAMINECTOMY SYNDROME OF LUMBAR REGION: ICD-10-CM

## 2025-02-11 RX ORDER — PREGABALIN 150 MG/1
150 CAPSULE ORAL 3 TIMES DAILY
Qty: 90 CAPSULE | Refills: 1 | Status: SHIPPED | OUTPATIENT
Start: 2025-02-11 | End: 2025-04-12

## 2025-02-11 RX ORDER — CYCLOBENZAPRINE HCL 10 MG
10 TABLET ORAL 3 TIMES DAILY PRN
Qty: 90 TABLET | Refills: 1 | Status: SHIPPED | OUTPATIENT
Start: 2025-02-11

## 2025-03-04 ENCOUNTER — APPOINTMENT (OUTPATIENT)
Dept: PAIN MEDICINE | Facility: CLINIC | Age: 55
End: 2025-03-04
Payer: MEDICARE

## 2025-03-18 ENCOUNTER — APPOINTMENT (OUTPATIENT)
Dept: PAIN MEDICINE | Facility: CLINIC | Age: 55
End: 2025-03-18
Payer: MEDICARE

## 2025-03-19 ENCOUNTER — APPOINTMENT (OUTPATIENT)
Dept: PAIN MEDICINE | Facility: CLINIC | Age: 55
End: 2025-03-19
Payer: MEDICARE

## 2025-03-24 NOTE — PROGRESS NOTES
[facility]  Follow Up Office Visit Note 3/27/2025    Patient Information: Prabha aMrk, MRN: [id number], : 1970   Primary Care/Referring Physician: Ariella Hall MD, [address] [facility] / [place] [facility]     Chief Complaint: Low and mid back pain    Interval History: Ms. Mark presents today for follow up. At her last visit I ordered a left SI joint injection, which was never performed. I also asked her to bring any lumbar spine imaging she has had recently on a CD    Today she reports doing slightly worse since last seen. She has started working part time at [employer] which has slightly worsened her pain. She did not schedule her injection due to scheduling issues but is interested in pursuing this now. She did not bring her imaging. She does not feel the Cyclobenzaprine is helping and is interested in trying something else    Brief History of Pain: Ms. Prabha Mark is a 54 y.o. female with a PMHx of chronic pancreatitis, schizoaffective disorder, PTSD, anxiety, hx of alcohol use disorder who presents for mid and low back pain.     She describes pain primarily in a band in her low back with occasional radiation down the back of her left leg, as well as pain between her shoulder blades. She has some tingling/numbness in her hands and feet. This pain is fairly constant and she has difficulty identifying triggering factors. Currently her pain is manageable and she does not feel that additional intervention is needed at this time. She needs a new pain management provider because Dr. Roque is no longer taking her insurance. He was prescribing her Pregabalin and Cyclobenzaprine with benefit.     I am able to see an MRI lumbar spine from  which shows laminectomies and poster fusion at L3-L5, (appears to have ?sacralization of L5). Dural ectasia noted and mild arachnoiditis. She has reportedly had additional surgeries since this, and thinks she had an MRI  more recently at [facility], but I do not see this in our system.     Of note, she was previously having issues with abdominal pain thought to be due to chronic pancreatitis but this has since improved. She had gotten a celiac plexus block from Dr. Roque for this with benefit.     Current Pain Medications: Pregabalin 150 mg TID, Cyclobenzaprine 10 mg TID PRN, Voltaren gel, uses medical THC  Previously Tried Pain Medications: Amitrypitline, Baclofen, Gabapentin, PO steroids    Relevant Surgeries: Reports undergoing 5 lumbar spine surgeries (details unclear) and ?C5-7 ACDF  Injections: She has undergone  injections with Dr. Roque in the past (celiac plexus blocks, caudal ARDEN, lumbar TFESI, lumbar mbb's)  Physical/Occupational Therapy: No recent PT but has completed extensive PT in the past    Medications:   Current Outpatient Medications   Medication Instructions    pregabalin (LYRICA) 150 mg, oral, 3 times daily    tiZANidine (ZANAFLEX) 4 mg, oral, 3 times daily PRN      Allergies:   Allergies   Allergen Reactions    Tetracycline Other     Pt. Stated unknown reaction happened as a child    Betadine Surgi-Prep Other     Pt. Stated unknown reaction happened as a child       Past Medical & Surgical History:  Past Medical History:   Diagnosis Date    COPD (chronic obstructive pulmonary disease) (Multi)     Malnutrition (Multi)       Past Surgical History:   Procedure Laterality Date    BACK SURGERY      LAMINECTOMY      LEG SURGERY Right        No family history on file.  Social History     Socioeconomic History    Marital status: Single     Spouse name: Not on file    Number of children: Not on file    Years of education: Not on file    Highest education level: Not on file   Occupational History    Not on file   Tobacco Use    Smoking status: Former     Types: Cigarettes    Smokeless tobacco: Never   Substance and Sexual Activity    Alcohol use: Never    Drug use: Yes     Types: Marijuana     Comment: medical marijuana     "Sexual activity: Not on file   Other Topics Concern    Not on file   Social History Narrative    Not on file     Social Drivers of Health     Financial Resource Strain: Not on File (2021)    Received from [facility]    Financial Resource Strain     Financial Resource Strain: 0   Food Insecurity: Not on File (2024)    Received from [facility]    Food Insecurity     Food: 0   Transportation Needs: Not on File (2021)    Received from [facility]    Transportation Needs     Transportation: 0   Physical Activity: Not on File (2021)    Received from [facility]    Physical Activity     Physical Activity: 0   Stress: Not on File (2021)    Received from [facility]    Stress     Stress: 0   Social Connections: Not on File (9/3/2024)    Received from [facility]    Social Connections     Connectedness: 0   Intimate Partner Violence: Not on file   Housing Stability: Not on File (2021)    Received from [facility]    Housing Stability     Housin       Problems, Past medical history, past surgical history, Medications, allergies, social and family history reviewed and as per the electronic medical record from today's encounter    Review of Systems:  CONST: No fever, chills, fatigue, weight changes  EYES: No loss of vision  ENT: No hearing loss, tinnitus  CV: No chest pain, palpitations  RESP: No dyspnea, shortness of breath, cough  GI: No stool incontinence, nausea, vomiting  : No urinary incontinence  MSK: No joint swelling  SKIN: No rash, no hives  NEURO: No headache, dizziness  PSYCH: Hx of PTSD, schizoaffective disorder, anxiety  HEM/LYMPH: No easy bruising or bleeding  All other systems reviewed are negative     Physical Exam:  Vitals: /81   Pulse 64   Ht 1.626 m (5' 4\")   Wt 61.7 kg (136 lb)   SpO2 98%   BMI 23.34 kg/m²   General: No apparent distress. Alert, appropriate, oriented x 3. Mood and affect normal. Speaking in full sentences.  HENT: Normocephalic, atraumatic. Hearing " "intact.  Eyes: Extraocular movements grossly intact. Pupils equal and round.   Neck: Supple, trachea midline.  Lungs: Symmetric respiratory excursion on visual exam, nonlabored breathing.  Extremities: No clubbing, cyanosis, or edema noted in arms or legs.  Skin: No rashes, lesions, alopecia noted on back or extremities. Large midline vertical scar from lumbosacral junction to thoracolumbar junction  Back: No spasm noted over musculature. No misalignment or asymmetry noted. GINGER test positive on the left, Gaenslen's test positive on the left, sacral compression test positive on the left  Neuro: Alert and appropriate. Gait within normal limits. Bulk and tone within normal limits.    Laboratory Data:  The following laboratory data were reviewed during this visit:   Lab Results   Component Value Date    WBC 15.3 (H) 05/22/2024    RBC 4.04 05/22/2024    HGB 11.3 (L) 05/22/2024    HCT 33.7 (L) 05/22/2024     05/22/2024      No results found for: \"INR\"  Lab Results   Component Value Date    CREATININE 0.90 05/22/2024       Imaging:  The following imaging impressions were reviewed by me during this visit:    -No recent relevant imaging for review    I also personally reviewed the images from the above studies myself. These images and my interpretation of them contributed to the management and decision making of the patient's medical plan.    ASSESSMENT:  Ms. Prabha Mark is a 54 y.o. female with low/mid back pain that is consistent with:    1. Arthropathy of left sacroiliac joint    2. Myofascial pain    3. Postlaminectomy syndrome of lumbar region    4. Lumbar facet arthropathy            PLAN:    Diagnostics:   - She has reportedly had additional lumbar spine imaging (?MRI) performed in the past few years which are not visible in Epic. I recommend she try to find these and bring them on a disc for me to review.     Physical Therapy and Rehabilitation:     - Continue your current HEP    Psychologically:  - " Continue following with your behavioral health provider    Medications  - Continue Pregabalin 150 mg TID  - Discontinue Cyclobenzaprine. Recommend trial of Tizanidine 4 mg TID PRN. Education on this medication provided today. Side effects reviewed    Duration  - Multiple years    Interventions:  - I suspect his pain is multi-factorial secondary to left sacroiliac joint arthropathy, lumbar radiculopathy, lumbar facet arthropathy, and myofascial pain, with possible contributions from chronic pancreatitis (abdominal and shoulder blade pain).  Currently her worst pain is in the left low back and buttock, most consistent with sacroiliac joint mediated pain. She has pain with multiple SI joint provocative maneuvers on exam today. I recommend a diagnostic left sacroiliac joint injection utilizing live fluoroscopy and local anesthesia. Risks, benefits, alternatives discussed. She would like to proceed.         Sincerely,  Dylan Noriega MD  [facility] - [place]

## 2025-03-27 ENCOUNTER — OFFICE VISIT (OUTPATIENT)
Dept: PAIN MEDICINE | Facility: CLINIC | Age: 55
End: 2025-03-27
Payer: MEDICARE

## 2025-03-27 VITALS
HEART RATE: 64 BPM | OXYGEN SATURATION: 98 % | SYSTOLIC BLOOD PRESSURE: 125 MMHG | HEIGHT: 64 IN | WEIGHT: 136 LBS | BODY MASS INDEX: 23.22 KG/M2 | DIASTOLIC BLOOD PRESSURE: 81 MMHG

## 2025-03-27 DIAGNOSIS — M79.18 MYOFASCIAL PAIN: ICD-10-CM

## 2025-03-27 DIAGNOSIS — M47.818 ARTHROPATHY OF LEFT SACROILIAC JOINT: Primary | ICD-10-CM

## 2025-03-27 DIAGNOSIS — M96.1 POSTLAMINECTOMY SYNDROME OF LUMBAR REGION: ICD-10-CM

## 2025-03-27 DIAGNOSIS — M47.816 LUMBAR FACET ARTHROPATHY: ICD-10-CM

## 2025-03-27 PROCEDURE — 3008F BODY MASS INDEX DOCD: CPT | Performed by: STUDENT IN AN ORGANIZED HEALTH CARE EDUCATION/TRAINING PROGRAM

## 2025-03-27 PROCEDURE — G2211 COMPLEX E/M VISIT ADD ON: HCPCS | Performed by: STUDENT IN AN ORGANIZED HEALTH CARE EDUCATION/TRAINING PROGRAM

## 2025-03-27 PROCEDURE — 99214 OFFICE O/P EST MOD 30 MIN: CPT | Performed by: STUDENT IN AN ORGANIZED HEALTH CARE EDUCATION/TRAINING PROGRAM

## 2025-03-27 PROCEDURE — 1036F TOBACCO NON-USER: CPT | Performed by: STUDENT IN AN ORGANIZED HEALTH CARE EDUCATION/TRAINING PROGRAM

## 2025-03-27 RX ORDER — TIZANIDINE 4 MG/1
4 TABLET ORAL 3 TIMES DAILY PRN
Qty: 90 TABLET | Refills: 2 | Status: SHIPPED | OUTPATIENT
Start: 2025-03-27 | End: 2025-06-25

## 2025-03-27 ASSESSMENT — PAIN - FUNCTIONAL ASSESSMENT: PAIN_FUNCTIONAL_ASSESSMENT: 0-10

## 2025-03-27 ASSESSMENT — PAIN DESCRIPTION - DESCRIPTORS: DESCRIPTORS: ACHING

## 2025-03-27 ASSESSMENT — PATIENT HEALTH QUESTIONNAIRE - PHQ9
SUM OF ALL RESPONSES TO PHQ9 QUESTIONS 1 AND 2: 0
2. FEELING DOWN, DEPRESSED OR HOPELESS: NOT AT ALL
1. LITTLE INTEREST OR PLEASURE IN DOING THINGS: NOT AT ALL

## 2025-03-27 ASSESSMENT — PAIN SCALES - GENERAL
PAINLEVEL_OUTOF10: 6
PAINLEVEL_OUTOF10: 6

## 2025-04-14 DIAGNOSIS — M96.1 POSTLAMINECTOMY SYNDROME OF LUMBAR REGION: ICD-10-CM

## 2025-04-15 RX ORDER — PREGABALIN 150 MG/1
150 CAPSULE ORAL 3 TIMES DAILY
Qty: 90 CAPSULE | Refills: 1 | Status: SHIPPED | OUTPATIENT
Start: 2025-04-17 | End: 2025-06-16

## 2025-04-25 ENCOUNTER — ANCILLARY PROCEDURE (OUTPATIENT)
Dept: RADIOLOGY | Facility: EXTERNAL LOCATION | Age: 55
End: 2025-04-25
Payer: MEDICARE

## 2025-04-25 DIAGNOSIS — M47.818 SPONDYLOSIS WITHOUT MYELOPATHY OR RADICULOPATHY, SACRAL AND SACROCOCCYGEAL REGION: ICD-10-CM

## 2025-04-25 NOTE — PROGRESS NOTES
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      DESCRIPTION OF PROCEDURE:    Prabha Mark was brought to the procedure room. The assistant obtained vital signs, which were found to be stable. She was then informed of the procedure, its benefits, and its risks, and her questions were answered regarding the procedure. Written informed consent was obtained from the patient. Immediately prior to starting the procedure, a time-out safety check was conducted and the patient's identification, procedure name, and procedure site were confirmed with the patient.    Left Sacroiliac Joint Injection:  After informed written consent was obtained, the patient was placed in prone position with a pillow under the abdomen to reduce lumbar lordosis. Monitoring of pulse oximetry, heart rate, and blood pressure was done pre-procedure, and post-procedure. During the procedure the patient was monitored with continuous pulse-ox. A time out was performed before the beginning of the procedure. The correct site and laterality were marked. The skin was prepped with chlorhexidine, allowed to dry for a minimum of 3 minutes, and draped in a sterile fashion     AP and oblique fluoroscopy imaging was used to identify the left sacroiliac joint. The fluoroscope was rotated to the contralateral oblique position of the joint to identify the point of maximal lucency between the sacrum and the medial border of the iliac crest. The fluoroscope was then tilted approximately 10 degrees caudad and the inferior pole of the sacroiliac joint was identified. The skin and subcutaneous tissue were then  anesthetized with infiltration of 3 mL of 1% lidocaine with a 1.5 in. 25 g needle. A 3.5 inch 22 G spinal needle was then advanced in a co-axial view towards the intraarticular space at the inferior pole. Once a change in resistance was encountered indicating entry into the joint space, 0.5 mL of Omnipaque 240 contrast was injected, showing spread within the joint. After negative aspiration for blood, 2 mL of ropivacaine 0.2% was then injected. The stylet was then replaced and the needle was withdrawn.      Anesthesia: local anesthesia   Complications: none      Dylan Noriega MD

## 2025-05-20 ENCOUNTER — OFFICE VISIT (OUTPATIENT)
Dept: PAIN MEDICINE | Facility: CLINIC | Age: 55
End: 2025-05-20
Payer: MEDICARE

## 2025-05-20 VITALS
HEIGHT: 64 IN | OXYGEN SATURATION: 99 % | WEIGHT: 136 LBS | RESPIRATION RATE: 20 BRPM | DIASTOLIC BLOOD PRESSURE: 56 MMHG | BODY MASS INDEX: 23.22 KG/M2 | HEART RATE: 67 BPM | SYSTOLIC BLOOD PRESSURE: 110 MMHG

## 2025-05-20 DIAGNOSIS — M96.1 POSTLAMINECTOMY SYNDROME OF LUMBAR REGION: Primary | ICD-10-CM

## 2025-05-20 PROCEDURE — 3008F BODY MASS INDEX DOCD: CPT | Performed by: STUDENT IN AN ORGANIZED HEALTH CARE EDUCATION/TRAINING PROGRAM

## 2025-05-20 PROCEDURE — G2211 COMPLEX E/M VISIT ADD ON: HCPCS | Performed by: STUDENT IN AN ORGANIZED HEALTH CARE EDUCATION/TRAINING PROGRAM

## 2025-05-20 PROCEDURE — 99214 OFFICE O/P EST MOD 30 MIN: CPT | Performed by: STUDENT IN AN ORGANIZED HEALTH CARE EDUCATION/TRAINING PROGRAM

## 2025-05-20 PROCEDURE — 1036F TOBACCO NON-USER: CPT | Performed by: STUDENT IN AN ORGANIZED HEALTH CARE EDUCATION/TRAINING PROGRAM

## 2025-05-20 RX ORDER — PREGABALIN 150 MG/1
150 CAPSULE ORAL 3 TIMES DAILY
Qty: 90 CAPSULE | Refills: 2 | Status: SHIPPED | OUTPATIENT
Start: 2025-05-20 | End: 2025-08-18

## 2025-05-20 ASSESSMENT — PAIN - FUNCTIONAL ASSESSMENT: PAIN_FUNCTIONAL_ASSESSMENT: 0-10

## 2025-05-20 ASSESSMENT — PAIN SCALES - GENERAL
PAINLEVEL_OUTOF10: 6
PAINLEVEL_OUTOF10: 6

## 2025-05-20 NOTE — PROGRESS NOTES
[facility] Pain Management  Follow Up Office Visit Note 2025    Patient Information: Prabha Mark, MRN: [id number], : 1970   Primary Care/Referring Physician: Ariella Hall MD, [address] [facility] / [place] [facility]*     Chief Complaint: Low and mid back pain    Interval History: Ms. Mark presents today for follow up after diagnostic left SI joint injection. I also started her on Tizanidine    Today she reports no significant improvement from this procedure. She is not taking the Tizanidine because it was not helping. She continues to have low back and left leg pain, similar to before.     Brief History of Pain: Ms. Prabha Mark is a 54 y.o. female with a PMHx of chronic pancreatitis, schizoaffective disorder, PTSD, anxiety, hx of alcohol use disorder who presents for mid and low back pain.     She describes pain primarily in a band in her low back with occasional radiation down the back of her left leg, as well as pain between her shoulder blades. She has some tingling/numbness in her hands and feet. This pain is fairly constant and she has difficulty identifying triggering factors. Currently her pain is manageable and she does not feel that additional intervention is needed at this time. She needs a new pain management provider because Dr. Roque is no longer taking her insurance. He was prescribing her Pregabalin and Cyclobenzaprine with benefit.     I am able to see an MRI lumbar spine from  which shows laminectomies and poster fusion at L3-L5, (appears to have ?sacralization of L5). Dural ectasia noted and mild arachnoiditis. She has reportedly had additional surgeries since this, and thinks she had an MRI more recently at [facility], but I do not see this in our system.     Of note, she was previously having issues with abdominal pain thought to be due to chronic pancreatitis but this has since improved. She had gotten a celiac plexus block from Dr. Roque for  this with benefit.     Current Pain Medications: Pregabalin 150 mg TID, Voltaren gel, uses medical THC  Previously Tried Pain Medications: Amitrypitline, Baclofen, Gabapentin, PO steroids, Tizanidine - no benefit, Cyclobenzaprine - no benefit    Relevant Surgeries: Reports undergoing 5 lumbar spine surgeries (details unclear) and ?C5-7 ACDF  Injections: She has undergone  injections with Dr. Roque in the past (celiac plexus blocks, caudal ARDEN, lumbar TFESI, lumbar mbb's). Left SI joint injection - no benefit  Physical/Occupational Therapy: No recent PT but has completed extensive PT in the past    Medications:   Current Outpatient Medications   Medication Instructions    pregabalin (LYRICA) 150 mg, oral, 3 times daily    tiZANidine (ZANAFLEX) 4 mg, oral, 3 times daily PRN      Allergies:   Allergies   Allergen Reactions    Tetracycline Other     Pt. Stated unknown reaction happened as a child    Betadine Surgi-Prep Other     Pt. Stated unknown reaction happened as a child       Past Medical & Surgical History:  Past Medical History:   Diagnosis Date    COPD (chronic obstructive pulmonary disease) (Multi)     Malnutrition (Multi)       Past Surgical History:   Procedure Laterality Date    BACK SURGERY      LAMINECTOMY      LEG SURGERY Right        No family history on file.  Social History     Socioeconomic History    Marital status: Single     Spouse name: Not on file    Number of children: Not on file    Years of education: Not on file    Highest education level: Not on file   Occupational History    Not on file   Tobacco Use    Smoking status: Former     Types: Cigarettes    Smokeless tobacco: Never   Substance and Sexual Activity    Alcohol use: Never    Drug use: Yes     Types: Marijuana     Comment: medical marijuana    Sexual activity: Not on file   Other Topics Concern    Not on file   Social History Narrative    Not on file     Social Drivers of Health     Financial Resource Strain: Not on File (5/21/2021)  "   Received from [facility]    Financial Resource Strain     Financial Resource Strain: 0   Food Insecurity: Not on File (2024)    Received from [facility]    Food Insecurity     Food: 0   Transportation Needs: Not on File (2021)    Received from [facility]    Transportation Needs     Transportation: 0   Physical Activity: Not on File (2021)    Received from [facility]    Physical Activity     Physical Activity: 0   Stress: Not on File (2021)    Received from [facility]    Stress     Stress: 0   Social Connections: Not on File (9/3/2024)    Received from [facility]    Social Connections     Connectedness: 0   Intimate Partner Violence: Not on file   Housing Stability: Not on File (2021)    Received from [employer]    Housing Stability     Housin       Problems, Past medical history, past surgical history, Medications, allergies, social and family history reviewed and as per the electronic medical record from today's encounter    Review of Systems:  CONST: No fever, chills, fatigue, weight changes  EYES: No loss of vision  ENT: No hearing loss, tinnitus  CV: No chest pain, palpitations  RESP: No dyspnea, shortness of breath, cough  GI: No stool incontinence, nausea, vomiting  : No urinary incontinence  MSK: No joint swelling  SKIN: No rash, no hives  NEURO: No headache, dizziness  PSYCH: Hx of PTSD, schizoaffective disorder, anxiety  HEM/LYMPH: No easy bruising or bleeding  All other systems reviewed are negative     Physical Exam:  Vitals: /56   Pulse 67   Resp 20   Ht 1.626 m (5' 4\")   Wt 61.7 kg (136 lb)   SpO2 99%   BMI 23.34 kg/m²   General: No apparent distress. Alert, appropriate, oriented x 3. Mood and affect normal. Speaking in full sentences.  HENT: Normocephalic, atraumatic. Hearing intact.  Eyes: Extraocular movements grossly intact. Pupils equal and round.   Neck: Supple, trachea midline.  Lungs: Symmetric respiratory excursion on visual exam, nonlabored breathing.  Extremities: No clubbing, " "cyanosis, or edema noted in arms or legs.  Skin: No rashes, lesions, alopecia noted on back or extremities. Large midline vertical scar from lumbosacral junction to thoracolumbar junction  Neuro: Alert and appropriate. Gait within normal limits. Bulk and tone within normal limits.    Laboratory Data:  The following laboratory data were reviewed during this visit:   Lab Results   Component Value Date    WBC 15.3 (H) 05/22/2024    RBC 4.04 05/22/2024    HGB 11.3 (L) 05/22/2024    HCT 33.7 (L) 05/22/2024     05/22/2024      No results found for: \"INR\"  Lab Results   Component Value Date    CREATININE 0.90 05/22/2024       Imaging:  The following imaging impressions were reviewed by me during this visit:    -No recent relevant imaging for review    I also personally reviewed the images from the above studies myself. These images and my interpretation of them contributed to the management and decision making of the patient's medical plan.    ASSESSMENT:  Ms. Prabha Mark is a 54 y.o. female with low/mid back pain that is consistent with:    1. Postlaminectomy syndrome of lumbar region          PLAN:    Diagnostics:   - Given persistence pain in her low back and down her left leg, as well a history of multiple lumbar spine surgeries, I recommend a lumbar spine MRI for diagnostic and interventional planning purposes    Physical Therapy and Rehabilitation:     - Continue your current HEP    Psychologically:  - Continue following with your behavioral health provider    Medications  - Continue Pregabalin 150 mg TID    Duration  - Multiple years    Interventions:  - I suspect his pain is multi-factorial secondary to left sacroiliac joint arthropathy, lumbar radiculopathy, lumbar facet arthropathy, and myofascial pain, with possible contributions from chronic pancreatitis (abdominal and shoulder blade pain).  She underwent a diagnostic left SI joint injection with minimal relief. Will await the results of her lumbar " spine MRI prior to considering additional intervention        Sincerely,  Dylan Noriega MD  [facility] - [place]